# Patient Record
Sex: FEMALE | Race: WHITE | NOT HISPANIC OR LATINO
[De-identification: names, ages, dates, MRNs, and addresses within clinical notes are randomized per-mention and may not be internally consistent; named-entity substitution may affect disease eponyms.]

---

## 2017-04-24 ENCOUNTER — APPOINTMENT (OUTPATIENT)
Dept: OTOLARYNGOLOGY | Facility: CLINIC | Age: 57
End: 2017-04-24

## 2017-04-24 PROBLEM — Z00.00 ENCOUNTER FOR PREVENTIVE HEALTH EXAMINATION: Status: ACTIVE | Noted: 2017-04-24

## 2017-05-23 ENCOUNTER — OUTPATIENT (OUTPATIENT)
Dept: OUTPATIENT SERVICES | Facility: HOSPITAL | Age: 57
LOS: 1 days | Discharge: HOME | End: 2017-05-23

## 2017-06-28 DIAGNOSIS — Z12.31 ENCOUNTER FOR SCREENING MAMMOGRAM FOR MALIGNANT NEOPLASM OF BREAST: ICD-10-CM

## 2017-08-31 ENCOUNTER — OUTPATIENT (OUTPATIENT)
Dept: OUTPATIENT SERVICES | Facility: HOSPITAL | Age: 57
LOS: 1 days | Discharge: HOME | End: 2017-08-31

## 2017-08-31 DIAGNOSIS — C71.9 MALIGNANT NEOPLASM OF BRAIN, UNSPECIFIED: ICD-10-CM

## 2017-08-31 DIAGNOSIS — Z92.3 PERSONAL HISTORY OF IRRADIATION: ICD-10-CM

## 2017-08-31 DIAGNOSIS — J18.9 PNEUMONIA, UNSPECIFIED ORGANISM: ICD-10-CM

## 2017-08-31 DIAGNOSIS — N39.0 URINARY TRACT INFECTION, SITE NOT SPECIFIED: ICD-10-CM

## 2017-08-31 DIAGNOSIS — Z92.29 PERSONAL HISTORY OF OTHER DRUG THERAPY: ICD-10-CM

## 2017-08-31 DIAGNOSIS — R22.0 LOCALIZED SWELLING, MASS AND LUMP, HEAD: ICD-10-CM

## 2017-09-06 ENCOUNTER — INPATIENT (INPATIENT)
Facility: HOSPITAL | Age: 57
LOS: 1 days | Discharge: HOME | End: 2017-09-08
Attending: INTERNAL MEDICINE

## 2017-09-06 DIAGNOSIS — Z92.3 PERSONAL HISTORY OF IRRADIATION: ICD-10-CM

## 2017-09-06 DIAGNOSIS — Z92.29 PERSONAL HISTORY OF OTHER DRUG THERAPY: ICD-10-CM

## 2017-09-06 DIAGNOSIS — J18.9 PNEUMONIA, UNSPECIFIED ORGANISM: ICD-10-CM

## 2017-09-06 DIAGNOSIS — N39.0 URINARY TRACT INFECTION, SITE NOT SPECIFIED: ICD-10-CM

## 2017-09-06 DIAGNOSIS — C71.9 MALIGNANT NEOPLASM OF BRAIN, UNSPECIFIED: ICD-10-CM

## 2017-09-12 DIAGNOSIS — C71.4 MALIGNANT NEOPLASM OF OCCIPITAL LOBE: ICD-10-CM

## 2017-09-12 DIAGNOSIS — R55 SYNCOPE AND COLLAPSE: ICD-10-CM

## 2017-09-12 DIAGNOSIS — G43.909 MIGRAINE, UNSPECIFIED, NOT INTRACTABLE, WITHOUT STATUS MIGRAINOSUS: ICD-10-CM

## 2017-09-12 DIAGNOSIS — D72.829 ELEVATED WHITE BLOOD CELL COUNT, UNSPECIFIED: ICD-10-CM

## 2017-09-12 DIAGNOSIS — R32 UNSPECIFIED URINARY INCONTINENCE: ICD-10-CM

## 2017-09-12 DIAGNOSIS — T38.0X5A ADVERSE EFFECT OF GLUCOCORTICOIDS AND SYNTHETIC ANALOGUES, INITIAL ENCOUNTER: ICD-10-CM

## 2017-09-14 ENCOUNTER — INPATIENT (INPATIENT)
Facility: HOSPITAL | Age: 57
LOS: 7 days | Discharge: NURSING, REHAB OR LONG-TERM CARE | End: 2017-09-22
Attending: NEUROLOGICAL SURGERY

## 2017-09-14 DIAGNOSIS — Z92.3 PERSONAL HISTORY OF IRRADIATION: ICD-10-CM

## 2017-09-14 DIAGNOSIS — J18.9 PNEUMONIA, UNSPECIFIED ORGANISM: ICD-10-CM

## 2017-09-14 DIAGNOSIS — N39.0 URINARY TRACT INFECTION, SITE NOT SPECIFIED: ICD-10-CM

## 2017-09-14 DIAGNOSIS — Z92.29 PERSONAL HISTORY OF OTHER DRUG THERAPY: ICD-10-CM

## 2017-09-14 DIAGNOSIS — C71.9 MALIGNANT NEOPLASM OF BRAIN, UNSPECIFIED: ICD-10-CM
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
## 2017-09-21 DIAGNOSIS — S06.0X9A CONCUSSION WITH LOSS OF CONSCIOUSNESS OF UNSPECIFIED DURATION, INITIAL ENCOUNTER: ICD-10-CM
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
## 2017-09-22 ENCOUNTER — INPATIENT (INPATIENT)
Facility: HOSPITAL | Age: 57
LOS: 13 days | Discharge: HOME HEALTH | End: 2017-10-06
Attending: PHYSICAL MEDICINE & REHABILITATION

## 2017-09-22 DIAGNOSIS — J18.9 PNEUMONIA, UNSPECIFIED ORGANISM: ICD-10-CM

## 2017-09-22 DIAGNOSIS — N39.0 URINARY TRACT INFECTION, SITE NOT SPECIFIED: ICD-10-CM

## 2017-09-22 DIAGNOSIS — Z92.29 PERSONAL HISTORY OF OTHER DRUG THERAPY: ICD-10-CM

## 2017-09-22 DIAGNOSIS — Z92.3 PERSONAL HISTORY OF IRRADIATION: ICD-10-CM

## 2017-09-22 DIAGNOSIS — C71.9 MALIGNANT NEOPLASM OF BRAIN, UNSPECIFIED: ICD-10-CM

## 2017-09-26 DIAGNOSIS — G93.6 CEREBRAL EDEMA: ICD-10-CM

## 2017-09-26 DIAGNOSIS — C71.9 MALIGNANT NEOPLASM OF BRAIN, UNSPECIFIED: ICD-10-CM

## 2017-09-26 DIAGNOSIS — R47.01 APHASIA: ICD-10-CM

## 2017-09-26 DIAGNOSIS — C71.2 MALIGNANT NEOPLASM OF TEMPORAL LOBE: ICD-10-CM

## 2017-09-26 DIAGNOSIS — R32 UNSPECIFIED URINARY INCONTINENCE: ICD-10-CM

## 2017-09-26 DIAGNOSIS — R29.810 FACIAL WEAKNESS: ICD-10-CM

## 2017-09-26 DIAGNOSIS — F43.21 ADJUSTMENT DISORDER WITH DEPRESSED MOOD: ICD-10-CM

## 2017-10-10 DIAGNOSIS — R21 RASH AND OTHER NONSPECIFIC SKIN ERUPTION: ICD-10-CM

## 2017-10-10 DIAGNOSIS — D64.9 ANEMIA, UNSPECIFIED: ICD-10-CM

## 2017-10-10 DIAGNOSIS — Z48.3 AFTERCARE FOLLOWING SURGERY FOR NEOPLASM: ICD-10-CM

## 2017-10-10 DIAGNOSIS — C71.9 MALIGNANT NEOPLASM OF BRAIN, UNSPECIFIED: ICD-10-CM

## 2017-10-10 DIAGNOSIS — F41.9 ANXIETY DISORDER, UNSPECIFIED: ICD-10-CM

## 2017-10-10 DIAGNOSIS — K59.00 CONSTIPATION, UNSPECIFIED: ICD-10-CM

## 2017-10-10 DIAGNOSIS — R47.01 APHASIA: ICD-10-CM

## 2017-10-10 DIAGNOSIS — D72.829 ELEVATED WHITE BLOOD CELL COUNT, UNSPECIFIED: ICD-10-CM

## 2017-10-11 ENCOUNTER — APPOINTMENT (OUTPATIENT)
Dept: HEMATOLOGY ONCOLOGY | Facility: CLINIC | Age: 57
End: 2017-10-11

## 2017-10-11 VITALS
DIASTOLIC BLOOD PRESSURE: 67 MMHG | TEMPERATURE: 97.9 F | SYSTOLIC BLOOD PRESSURE: 132 MMHG | BODY MASS INDEX: 31.85 KG/M2 | HEIGHT: 59 IN | RESPIRATION RATE: 16 BRPM | HEART RATE: 72 BPM | WEIGHT: 158 LBS

## 2017-10-11 DIAGNOSIS — C71.9 MALIGNANT NEOPLASM OF BRAIN, UNSPECIFIED: ICD-10-CM

## 2017-10-12 PROBLEM — C71.9 GLIOBLASTOMA MULTIFORME: Status: ACTIVE | Noted: 2017-10-11

## 2017-10-12 RX ORDER — LEVETIRACETAM 500 MG/1
500 TABLET, FILM COATED ORAL
Qty: 60 | Refills: 0 | Status: ACTIVE | COMMUNITY
Start: 2017-09-01

## 2017-10-12 RX ORDER — PANTOPRAZOLE 40 MG/1
40 TABLET, DELAYED RELEASE ORAL
Qty: 90 | Refills: 0 | Status: ACTIVE | COMMUNITY
Start: 2017-10-06

## 2017-11-08 ENCOUNTER — APPOINTMENT (OUTPATIENT)
Dept: HEMATOLOGY ONCOLOGY | Facility: CLINIC | Age: 57
End: 2017-11-08

## 2017-11-15 ENCOUNTER — APPOINTMENT (OUTPATIENT)
Dept: HEMATOLOGY ONCOLOGY | Facility: CLINIC | Age: 57
End: 2017-11-15

## 2017-11-15 ENCOUNTER — OTHER (OUTPATIENT)
Age: 57
End: 2017-11-15

## 2017-11-15 VITALS
SYSTOLIC BLOOD PRESSURE: 107 MMHG | HEART RATE: 76 BPM | WEIGHT: 158 LBS | RESPIRATION RATE: 16 BRPM | BODY MASS INDEX: 31.85 KG/M2 | TEMPERATURE: 96.4 F | HEIGHT: 59 IN | DIASTOLIC BLOOD PRESSURE: 70 MMHG

## 2017-11-15 RX ORDER — TEMOZOLOMIDE 100 MG/1
100 CAPSULE ORAL
Qty: 5 | Refills: 0 | Status: ACTIVE | COMMUNITY
Start: 2017-11-15 | End: 1900-01-01

## 2017-11-15 RX ORDER — TEMOZOLOMIDE 250 MG/1
250 CAPSULE ORAL
Qty: 5 | Refills: 0 | Status: ACTIVE | COMMUNITY
Start: 2017-10-12 | End: 1900-01-01

## 2017-11-15 RX ORDER — FLUCONAZOLE 150 MG/1
150 TABLET ORAL
Qty: 5 | Refills: 0 | Status: ACTIVE | COMMUNITY
Start: 2017-11-15 | End: 1900-01-01

## 2017-11-16 LAB
ALBUMIN SERPL-MCNC: 3.7 G/DL
ALBUMIN/GLOB SERPL: 1.76
ALP SERPL-CCNC: 41 IU/L
ALT SERPL-CCNC: 54 IU/L
ANION GAP SERPL CALC-SCNC: 10 MEQ/L
AST SERPL-CCNC: 24 IU/L
BASOPHILS # BLD: 0.01 TH/MM3
BASOPHILS NFR BLD: 0.1 %
BILIRUB SERPL-MCNC: 0.6 MG/DL
BUN SERPL-MCNC: 20 MG/DL
BUN/CREAT SERPL: 25.6 %
CALCIUM SERPL-MCNC: 9.2 MG/DL
CHLORIDE SERPL-SCNC: 103 MEQ/L
CO2 SERPL-SCNC: 25 MEQ/L
CREAT SERPL-MCNC: 0.78 MG/DL
EOSINOPHIL # BLD: 0 TH/MM3
EOSINOPHIL NFR BLD: 0 %
ERYTHROCYTE [DISTWIDTH] IN BLOOD BY AUTOMATED COUNT: 16.2 %
GFR SERPL CREATININE-BSD FRML MDRD: 76
GLUCOSE SERPL-MCNC: 123 MG/DL
GRANULOCYTES # BLD: 7.94 TH/MM3
GRANULOCYTES NFR BLD: 87.3 %
HCT VFR BLD AUTO: 40.3 %
HGB BLD-MCNC: 13.5 G/DL
IMM GRANULOCYTES # BLD: 0.1 TH/MM3
IMM GRANULOCYTES NFR BLD: 1.1 %
LYMPHOCYTES # BLD: 0.87 TH/MM3
LYMPHOCYTES NFR BLD: 9.6 %
MCH RBC QN AUTO: 29.5 PG
MCHC RBC AUTO-ENTMCNC: 33.5 G/DL
MCV RBC AUTO: 88 FL
MONOCYTES # BLD: 0.17 TH/MM3
MONOCYTES NFR BLD: 1.9 %
PLATELET # BLD: 142 TH/MM3
PMV BLD AUTO: 8.4 FL
POTASSIUM SERPL-SCNC: 4.1 MMOL/L
PROT SERPL-MCNC: 5.8 G/DL
RBC # BLD AUTO: 4.58 MIL/MM3
SODIUM SERPL-SCNC: 138 MEQ/L
WBC # BLD: 9.09 TH/MM3

## 2017-11-17 ENCOUNTER — INPATIENT (INPATIENT)
Facility: HOSPITAL | Age: 57
LOS: 11 days | Discharge: HOME | End: 2017-11-29
Attending: INTERNAL MEDICINE

## 2017-11-17 DIAGNOSIS — N39.0 URINARY TRACT INFECTION, SITE NOT SPECIFIED: ICD-10-CM

## 2017-11-17 DIAGNOSIS — Z92.29 PERSONAL HISTORY OF OTHER DRUG THERAPY: ICD-10-CM

## 2017-11-17 DIAGNOSIS — Z92.3 PERSONAL HISTORY OF IRRADIATION: ICD-10-CM

## 2017-11-17 DIAGNOSIS — C71.9 MALIGNANT NEOPLASM OF BRAIN, UNSPECIFIED: ICD-10-CM

## 2017-11-17 DIAGNOSIS — J18.9 PNEUMONIA, UNSPECIFIED ORGANISM: ICD-10-CM

## 2017-11-30 DIAGNOSIS — Z66 DO NOT RESUSCITATE: ICD-10-CM

## 2017-11-30 DIAGNOSIS — G93.41 METABOLIC ENCEPHALOPATHY: ICD-10-CM

## 2017-11-30 DIAGNOSIS — C71.9 MALIGNANT NEOPLASM OF BRAIN, UNSPECIFIED: ICD-10-CM

## 2017-11-30 DIAGNOSIS — G51.0 BELL'S PALSY: ICD-10-CM

## 2017-11-30 DIAGNOSIS — R41.82 ALTERED MENTAL STATUS, UNSPECIFIED: ICD-10-CM

## 2017-11-30 DIAGNOSIS — F41.9 ANXIETY DISORDER, UNSPECIFIED: ICD-10-CM

## 2017-11-30 DIAGNOSIS — G93.6 CEREBRAL EDEMA: ICD-10-CM

## 2017-11-30 DIAGNOSIS — R45.1 RESTLESSNESS AND AGITATION: ICD-10-CM

## 2017-11-30 DIAGNOSIS — R47.01 APHASIA: ICD-10-CM

## 2017-11-30 DIAGNOSIS — Z92.21 PERSONAL HISTORY OF ANTINEOPLASTIC CHEMOTHERAPY: ICD-10-CM

## 2017-11-30 DIAGNOSIS — Z92.3 PERSONAL HISTORY OF IRRADIATION: ICD-10-CM

## 2017-12-13 ENCOUNTER — APPOINTMENT (OUTPATIENT)
Dept: HEMATOLOGY ONCOLOGY | Facility: CLINIC | Age: 57
End: 2017-12-13

## 2017-12-13 ENCOUNTER — OUTPATIENT (OUTPATIENT)
Dept: OUTPATIENT SERVICES | Facility: HOSPITAL | Age: 57
LOS: 1 days | Discharge: HOME | End: 2017-12-13

## 2017-12-13 VITALS
HEART RATE: 98 BPM | SYSTOLIC BLOOD PRESSURE: 121 MMHG | TEMPERATURE: 97.1 F | HEIGHT: 59 IN | RESPIRATION RATE: 16 BRPM | BODY MASS INDEX: 30.44 KG/M2 | DIASTOLIC BLOOD PRESSURE: 80 MMHG | WEIGHT: 151 LBS

## 2017-12-13 DIAGNOSIS — C71.9 MALIGNANT NEOPLASM OF BRAIN, UNSPECIFIED: ICD-10-CM

## 2017-12-13 RX ORDER — LEVETIRACETAM 500 MG/1
500 TABLET, FILM COATED ORAL TWICE DAILY
Qty: 30 | Refills: 4 | Status: ACTIVE | COMMUNITY
Start: 2017-11-15 | End: 1900-01-01

## 2017-12-13 RX ORDER — DEXAMETHASONE 4 MG/1
4 TABLET ORAL
Qty: 120 | Refills: 0 | Status: ACTIVE | COMMUNITY
Start: 2017-10-11 | End: 1900-01-01

## 2017-12-14 ENCOUNTER — CHART COPY (OUTPATIENT)
Age: 57
End: 2017-12-14

## 2017-12-14 LAB
ALBUMIN SERPL-MCNC: 3.4 G/DL
ALBUMIN/GLOB SERPL: 2
ALP SERPL-CCNC: 61 IU/L
ALT SERPL-CCNC: 90 IU/L
ANION GAP SERPL CALC-SCNC: 10 MEQ/L
AST SERPL-CCNC: 56 IU/L
BASOPHILS # BLD: 0 TH/MM3
BASOPHILS NFR BLD: 0 %
BILIRUB SERPL-MCNC: 2 MG/DL
BUN SERPL-MCNC: 29 MG/DL
BUN/CREAT SERPL: 35.4 %
CALCIUM SERPL-MCNC: 8.9 MG/DL
CHLORIDE SERPL-SCNC: 107 MEQ/L
CO2 SERPL-SCNC: 20 MEQ/L
CREAT SERPL-MCNC: 0.82 MG/DL
EOSINOPHIL # BLD: 0.02 TH/MM3
EOSINOPHIL NFR BLD: 0.3 %
ERYTHROCYTE [DISTWIDTH] IN BLOOD BY AUTOMATED COUNT: 16 %
GFR SERPL CREATININE-BSD FRML MDRD: 71
GLUCOSE SERPL-MCNC: 142 MG/DL
GRANULOCYTES # BLD: 7.45 TH/MM3
GRANULOCYTES NFR BLD: 93 %
HCT VFR BLD AUTO: 39.3 %
HGB BLD-MCNC: 12.9 G/DL
IMM GRANULOCYTES # BLD: 0.02 TH/MM3
IMM GRANULOCYTES NFR BLD: 0.3 %
LYMPHOCYTES # BLD: 0.47 TH/MM3
LYMPHOCYTES NFR BLD: 5.9 %
MCH RBC QN AUTO: 29.5 PG
MCHC RBC AUTO-ENTMCNC: 32.8 G/DL
MCV RBC AUTO: 89.7 FL
MONOCYTES # BLD: 0.04 TH/MM3
MONOCYTES NFR BLD: 0.5 %
PLATELET # BLD: 125 TH/MM3
PMV BLD AUTO: 9.8 FL
POTASSIUM SERPL-SCNC: 4.5 MMOL/L
PROT SERPL-MCNC: 5.1 G/DL
RBC # BLD AUTO: 4.38 MIL/MM3
SODIUM SERPL-SCNC: 137 MEQ/L
WBC # BLD: 8 TH/MM3

## 2017-12-18 ENCOUNTER — APPOINTMENT (OUTPATIENT)
Dept: HEMATOLOGY ONCOLOGY | Facility: CLINIC | Age: 57
End: 2017-12-18

## 2017-12-18 ENCOUNTER — INPATIENT (INPATIENT)
Facility: HOSPITAL | Age: 57
LOS: 8 days | Discharge: HOSPICE | End: 2017-12-27
Attending: INTERNAL MEDICINE

## 2017-12-18 DIAGNOSIS — N39.0 URINARY TRACT INFECTION, SITE NOT SPECIFIED: ICD-10-CM

## 2017-12-18 DIAGNOSIS — Z92.3 PERSONAL HISTORY OF IRRADIATION: ICD-10-CM

## 2017-12-18 DIAGNOSIS — J18.9 PNEUMONIA, UNSPECIFIED ORGANISM: ICD-10-CM

## 2017-12-18 DIAGNOSIS — C71.9 MALIGNANT NEOPLASM OF BRAIN, UNSPECIFIED: ICD-10-CM

## 2017-12-18 DIAGNOSIS — Z92.29 PERSONAL HISTORY OF OTHER DRUG THERAPY: ICD-10-CM

## 2017-12-20 ENCOUNTER — APPOINTMENT (OUTPATIENT)
Dept: HEMATOLOGY ONCOLOGY | Facility: CLINIC | Age: 57
End: 2017-12-20

## 2017-12-27 ENCOUNTER — OUTPATIENT (OUTPATIENT)
Dept: OUTPATIENT SERVICES | Facility: HOSPITAL | Age: 57
LOS: 1 days | Discharge: HOSPICE | End: 2017-12-27

## 2017-12-27 DIAGNOSIS — C71.9 MALIGNANT NEOPLASM OF BRAIN, UNSPECIFIED: ICD-10-CM

## 2018-01-10 DIAGNOSIS — A41.9 SEPSIS, UNSPECIFIED ORGANISM: ICD-10-CM

## 2018-01-10 DIAGNOSIS — R19.7 DIARRHEA, UNSPECIFIED: ICD-10-CM

## 2018-01-10 DIAGNOSIS — Z92.3 PERSONAL HISTORY OF IRRADIATION: ICD-10-CM

## 2018-01-10 DIAGNOSIS — E87.8 OTHER DISORDERS OF ELECTROLYTE AND FLUID BALANCE, NOT ELSEWHERE CLASSIFIED: ICD-10-CM

## 2018-01-10 DIAGNOSIS — Z66 DO NOT RESUSCITATE: ICD-10-CM

## 2018-01-10 DIAGNOSIS — R50.81 FEVER PRESENTING WITH CONDITIONS CLASSIFIED ELSEWHERE: ICD-10-CM

## 2018-01-10 DIAGNOSIS — D69.59 OTHER SECONDARY THROMBOCYTOPENIA: ICD-10-CM

## 2018-01-10 DIAGNOSIS — R26.9 UNSPECIFIED ABNORMALITIES OF GAIT AND MOBILITY: ICD-10-CM

## 2018-01-10 DIAGNOSIS — E87.2 ACIDOSIS: ICD-10-CM

## 2018-01-10 DIAGNOSIS — C71.9 MALIGNANT NEOPLASM OF BRAIN, UNSPECIFIED: ICD-10-CM

## 2018-01-10 DIAGNOSIS — D61.810 ANTINEOPLASTIC CHEMOTHERAPY INDUCED PANCYTOPENIA: ICD-10-CM

## 2018-01-10 DIAGNOSIS — T45.1X5A ADVERSE EFFECT OF ANTINEOPLASTIC AND IMMUNOSUPPRESSIVE DRUGS, INITIAL ENCOUNTER: ICD-10-CM

## 2018-01-10 DIAGNOSIS — J96.00 ACUTE RESPIRATORY FAILURE, UNSPECIFIED WHETHER WITH HYPOXIA OR HYPERCAPNIA: ICD-10-CM

## 2018-01-10 DIAGNOSIS — Z51.5 ENCOUNTER FOR PALLIATIVE CARE: ICD-10-CM

## 2018-01-10 DIAGNOSIS — J18.9 PNEUMONIA, UNSPECIFIED ORGANISM: ICD-10-CM

## 2018-01-10 DIAGNOSIS — G93.6 CEREBRAL EDEMA: ICD-10-CM

## 2018-01-10 DIAGNOSIS — Z92.21 PERSONAL HISTORY OF ANTINEOPLASTIC CHEMOTHERAPY: ICD-10-CM

## 2018-01-10 DIAGNOSIS — N39.0 URINARY TRACT INFECTION, SITE NOT SPECIFIED: ICD-10-CM

## 2018-01-15 ENCOUNTER — INPATIENT (INPATIENT)
Facility: HOSPITAL | Age: 58
LOS: 3 days | Discharge: HOSPICE | End: 2018-01-19
Attending: INTERNAL MEDICINE

## 2018-01-15 DIAGNOSIS — Z92.29 PERSONAL HISTORY OF OTHER DRUG THERAPY: ICD-10-CM

## 2018-01-15 DIAGNOSIS — Z92.3 PERSONAL HISTORY OF IRRADIATION: ICD-10-CM

## 2018-01-15 DIAGNOSIS — J18.9 PNEUMONIA, UNSPECIFIED ORGANISM: ICD-10-CM

## 2018-01-15 DIAGNOSIS — N39.0 URINARY TRACT INFECTION, SITE NOT SPECIFIED: ICD-10-CM

## 2018-01-15 DIAGNOSIS — C71.9 MALIGNANT NEOPLASM OF BRAIN, UNSPECIFIED: ICD-10-CM

## 2018-01-19 ENCOUNTER — OUTPATIENT (OUTPATIENT)
Dept: OUTPATIENT SERVICES | Facility: HOSPITAL | Age: 58
LOS: 1 days | Discharge: HOME | End: 2018-01-19

## 2018-01-19 DIAGNOSIS — C71.9 MALIGNANT NEOPLASM OF BRAIN, UNSPECIFIED: ICD-10-CM

## 2018-01-24 DIAGNOSIS — Z92.21 PERSONAL HISTORY OF ANTINEOPLASTIC CHEMOTHERAPY: ICD-10-CM

## 2018-01-24 DIAGNOSIS — Z51.5 ENCOUNTER FOR PALLIATIVE CARE: ICD-10-CM

## 2018-01-24 DIAGNOSIS — N39.0 URINARY TRACT INFECTION, SITE NOT SPECIFIED: ICD-10-CM

## 2018-01-24 DIAGNOSIS — Z92.3 PERSONAL HISTORY OF IRRADIATION: ICD-10-CM

## 2018-01-24 DIAGNOSIS — Z88.8 ALLERGY STATUS TO OTHER DRUGS, MEDICAMENTS AND BIOLOGICAL SUBSTANCES STATUS: ICD-10-CM

## 2018-01-24 DIAGNOSIS — C71.9 MALIGNANT NEOPLASM OF BRAIN, UNSPECIFIED: ICD-10-CM

## 2018-01-24 DIAGNOSIS — Z66 DO NOT RESUSCITATE: ICD-10-CM

## 2018-01-24 DIAGNOSIS — J18.9 PNEUMONIA, UNSPECIFIED ORGANISM: ICD-10-CM

## 2018-02-01 ENCOUNTER — INPATIENT (INPATIENT)
Facility: HOSPITAL | Age: 58
LOS: 6 days | Discharge: HOME | End: 2018-02-08
Attending: INTERNAL MEDICINE

## 2018-02-03 VITALS — WEIGHT: 149.25 LBS | HEIGHT: 65 IN

## 2018-02-03 RX ORDER — LEVETIRACETAM 250 MG/1
750 TABLET, FILM COATED ORAL
Qty: 0 | Refills: 0 | Status: DISCONTINUED | OUTPATIENT
Start: 2018-02-03 | End: 2018-02-03

## 2018-02-03 RX ORDER — SODIUM CHLORIDE 9 MG/ML
1000 INJECTION INTRAMUSCULAR; INTRAVENOUS; SUBCUTANEOUS
Qty: 0 | Refills: 0 | Status: DISCONTINUED | OUTPATIENT
Start: 2018-02-03 | End: 2018-02-04

## 2018-02-03 RX ORDER — LEVETIRACETAM 250 MG/1
750 TABLET, FILM COATED ORAL EVERY 12 HOURS
Qty: 0 | Refills: 0 | Status: DISCONTINUED | OUTPATIENT
Start: 2018-02-03 | End: 2018-02-06

## 2018-02-03 RX ORDER — IPRATROPIUM/ALBUTEROL SULFATE 18-103MCG
3 AEROSOL WITH ADAPTER (GRAM) INHALATION EVERY 4 HOURS
Qty: 0 | Refills: 0 | Status: COMPLETED | OUTPATIENT
Start: 2018-02-03 | End: 2018-02-04

## 2018-02-03 RX ORDER — ENOXAPARIN SODIUM 100 MG/ML
40 INJECTION SUBCUTANEOUS AT BEDTIME
Qty: 0 | Refills: 0 | Status: DISCONTINUED | OUTPATIENT
Start: 2018-02-03 | End: 2018-02-08

## 2018-02-03 RX ORDER — IPRATROPIUM/ALBUTEROL SULFATE 18-103MCG
3 AEROSOL WITH ADAPTER (GRAM) INHALATION EVERY 4 HOURS
Qty: 0 | Refills: 0 | Status: DISCONTINUED | OUTPATIENT
Start: 2018-02-03 | End: 2018-02-08

## 2018-02-03 RX ORDER — MORPHINE SULFATE 50 MG/1
10 CAPSULE, EXTENDED RELEASE ORAL EVERY 6 HOURS
Qty: 0 | Refills: 0 | Status: DISCONTINUED | OUTPATIENT
Start: 2018-02-03 | End: 2018-02-05

## 2018-02-03 RX ORDER — DEXAMETHASONE 0.5 MG/5ML
2 ELIXIR ORAL EVERY 8 HOURS
Qty: 0 | Refills: 0 | Status: DISCONTINUED | OUTPATIENT
Start: 2018-02-03 | End: 2018-02-07

## 2018-02-03 RX ORDER — PANTOPRAZOLE SODIUM 20 MG/1
40 TABLET, DELAYED RELEASE ORAL DAILY
Qty: 0 | Refills: 0 | Status: DISCONTINUED | OUTPATIENT
Start: 2018-02-03 | End: 2018-02-07

## 2018-02-03 RX ADMIN — ENOXAPARIN SODIUM 40 MILLIGRAM(S): 100 INJECTION SUBCUTANEOUS at 21:48

## 2018-02-03 RX ADMIN — LEVETIRACETAM 400 MILLIGRAM(S): 250 TABLET, FILM COATED ORAL at 22:16

## 2018-02-03 RX ADMIN — Medication 2 MILLIGRAM(S): at 21:48

## 2018-02-03 RX ADMIN — Medication 2 MILLIGRAM(S): at 21:56

## 2018-02-03 RX ADMIN — SODIUM CHLORIDE 50 MILLILITER(S): 9 INJECTION INTRAMUSCULAR; INTRAVENOUS; SUBCUTANEOUS at 21:47

## 2018-02-04 RX ADMIN — Medication 2 MILLIGRAM(S): at 23:30

## 2018-02-04 RX ADMIN — MORPHINE SULFATE 10 MILLIGRAM(S): 50 CAPSULE, EXTENDED RELEASE ORAL at 21:35

## 2018-02-04 RX ADMIN — LEVETIRACETAM 400 MILLIGRAM(S): 250 TABLET, FILM COATED ORAL at 05:58

## 2018-02-04 RX ADMIN — Medication 2 MILLIGRAM(S): at 14:39

## 2018-02-04 RX ADMIN — MORPHINE SULFATE 10 MILLIGRAM(S): 50 CAPSULE, EXTENDED RELEASE ORAL at 11:28

## 2018-02-04 RX ADMIN — ENOXAPARIN SODIUM 40 MILLIGRAM(S): 100 INJECTION SUBCUTANEOUS at 21:28

## 2018-02-04 RX ADMIN — PANTOPRAZOLE SODIUM 40 MILLIGRAM(S): 20 TABLET, DELAYED RELEASE ORAL at 11:01

## 2018-02-04 RX ADMIN — LEVETIRACETAM 400 MILLIGRAM(S): 250 TABLET, FILM COATED ORAL at 18:30

## 2018-02-04 RX ADMIN — Medication 2 MILLIGRAM(S): at 21:27

## 2018-02-04 RX ADMIN — Medication 2 MILLIGRAM(S): at 05:46

## 2018-02-04 RX ADMIN — MORPHINE SULFATE 10 MILLIGRAM(S): 50 CAPSULE, EXTENDED RELEASE ORAL at 22:08

## 2018-02-04 RX ADMIN — MORPHINE SULFATE 10 MILLIGRAM(S): 50 CAPSULE, EXTENDED RELEASE ORAL at 10:50

## 2018-02-04 RX ADMIN — MORPHINE SULFATE 10 MILLIGRAM(S): 50 CAPSULE, EXTENDED RELEASE ORAL at 03:39

## 2018-02-04 NOTE — PROGRESS NOTE ADULT - SUBJECTIVE AND OBJECTIVE BOX
Patient is a 57y old  Female who presents with a chief complaint of unresponsiveness    INTERVAL HPI/OVERNIGHT EVENTS: lethargy  HPI:    MEDICATIONS  (STANDING):  dexamethasone  Injectable 2 milliGRAM(s) IV Push every 8 hours  enoxaparin Injectable 40 milliGRAM(s) SubCutaneous at bedtime  levETIRAcetam  IVPB 750 milliGRAM(s) IV Intermittent every 12 hours  pantoprazole  Injectable 40 milliGRAM(s) IV Push daily    MEDICATIONS  (PRN):  ALBUTerol/ipratropium for Nebulization 3 milliLiter(s) Nebulizer every 4 hours PRN Shortness of Breath  LORazepam   Injectable 2 milliGRAM(s) IV Push every 4 hours PRN seizures  morphine   Solution 10 milliGRAM(s) Oral every 6 hours PRN Moderate Pain (4 - 6)    Home Medications:      Vital Signs Last 24 Hrs  T(C): 35.6 (04 Feb 2018 07:50), Max: 35.9 (03 Feb 2018 15:15)  T(F): 96.1 (04 Feb 2018 07:50), Max: 96.7 (03 Feb 2018 15:15)  HR: 80 (04 Feb 2018 07:50) (69 - 84)  BP: 156/76 (04 Feb 2018 07:50) (140/77 - 156/76)  BP(mean): --  RR: 18 (03 Feb 2018 23:57) (18 - 20)  SpO2: --  CAPILLARY BLOOD GLUCOSE          General: lethargic, chronically ill appearing  CV: S1 S2  Pulm: decreased breath sounds at base  Abd: NT/ND/S +BS  EXT: no clubbing/cyanosis/edema  Neuro: unable to assess    LABS:      RADIOLOGY & ADDITIONAL TESTS:    Assessment and Plan:                    GI/DVT Prophylaxis  discussed with staff Patient is a 57y old  Female who presents with a chief complaint of unresponsiveness    INTERVAL HPI/OVERNIGHT EVENTS: lethargy  HPI:    MEDICATIONS  (STANDING):  dexamethasone  Injectable 2 milliGRAM(s) IV Push every 8 hours  enoxaparin Injectable 40 milliGRAM(s) SubCutaneous at bedtime  levETIRAcetam  IVPB 750 milliGRAM(s) IV Intermittent every 12 hours  pantoprazole  Injectable 40 milliGRAM(s) IV Push daily    MEDICATIONS  (PRN):  ALBUTerol/ipratropium for Nebulization 3 milliLiter(s) Nebulizer every 4 hours PRN Shortness of Breath  LORazepam   Injectable 2 milliGRAM(s) IV Push every 4 hours PRN seizures  morphine   Solution 10 milliGRAM(s) Oral every 6 hours PRN Moderate Pain (4 - 6)    Home Medications:      Vital Signs Last 24 Hrs  T(C): 35.6 (04 Feb 2018 07:50), Max: 35.9 (03 Feb 2018 15:15)  T(F): 96.1 (04 Feb 2018 07:50), Max: 96.7 (03 Feb 2018 15:15)  HR: 80 (04 Feb 2018 07:50) (69 - 84)  BP: 156/76 (04 Feb 2018 07:50) (140/77 - 156/76)  BP(mean): --  RR: 18 (03 Feb 2018 23:57) (18 - 20)  SpO2: --  CAPILLARY BLOOD GLUCOSE          General: lethargic, chronically ill appearing  CV: S1 S2  Pulm: decreased breath sounds at base  Abd: NT/ND/S +BS  EXT: no clubbing/cyanosis/edema  Neuro: unable to assess    LABS:      RADIOLOGY & ADDITIONAL TESTS:    Assessment and Plan:    56yo with GBM on home hospice p/w unresponsiveness and seizure.    -f/u REEG  -cont Keppra, Decadron IV as pt too lethargic to take anything PO  -if EEG negative and pt does not pass speech and swallow, will d/w daughter Peg placement for med administration as part of hospice care (daughter wants to take pt home on hospice as per my conversation with her once she is able to take meds)  -DNR/DNI  -grave Px                GI/DVT Prophylaxis

## 2018-02-05 LAB
ANION GAP SERPL CALC-SCNC: 9 MMOL/L — SIGNIFICANT CHANGE UP (ref 7–14)
BUN SERPL-MCNC: 24 MG/DL — HIGH (ref 10–20)
CALCIUM SERPL-MCNC: 9 MG/DL — SIGNIFICANT CHANGE UP (ref 8.5–10.1)
CHLORIDE SERPL-SCNC: 109 MMOL/L — SIGNIFICANT CHANGE UP (ref 98–110)
CO2 SERPL-SCNC: 24 MMOL/L — SIGNIFICANT CHANGE UP (ref 17–32)
CREAT SERPL-MCNC: 0.7 MG/DL — SIGNIFICANT CHANGE UP (ref 0.7–1.5)
GLUCOSE SERPL-MCNC: 97 MG/DL — SIGNIFICANT CHANGE UP (ref 70–110)
HCT VFR BLD CALC: 31.7 % — LOW (ref 37–47)
HGB BLD-MCNC: 10.4 G/DL — LOW (ref 14–18)
MCHC RBC-ENTMCNC: 30.6 PG — SIGNIFICANT CHANGE UP (ref 27–31)
MCHC RBC-ENTMCNC: 32.8 G/DL — SIGNIFICANT CHANGE UP (ref 32–37)
MCV RBC AUTO: 93.2 FL — HIGH (ref 81–91)
NRBC # BLD: 1 /100 WBCS — HIGH (ref 0–0)
PLATELET # BLD AUTO: 228 K/UL — SIGNIFICANT CHANGE UP (ref 130–400)
POTASSIUM SERPL-MCNC: 3.3 MMOL/L — LOW (ref 3.5–5)
POTASSIUM SERPL-SCNC: 3.3 MMOL/L — LOW (ref 3.5–5)
RBC # BLD: 3.4 M/UL — LOW (ref 4.2–5.4)
RBC # FLD: 16.5 % — HIGH (ref 11.5–14.5)
SODIUM SERPL-SCNC: 142 MMOL/L — SIGNIFICANT CHANGE UP (ref 135–146)
WBC # BLD: 10.31 K/UL — SIGNIFICANT CHANGE UP (ref 4.8–10.8)
WBC # FLD AUTO: 10.31 K/UL — SIGNIFICANT CHANGE UP (ref 4.8–10.8)

## 2018-02-05 RX ORDER — POTASSIUM CHLORIDE 20 MEQ
10 PACKET (EA) ORAL
Qty: 0 | Refills: 0 | Status: DISCONTINUED | OUTPATIENT
Start: 2018-02-05 | End: 2018-02-05

## 2018-02-05 RX ORDER — MORPHINE SULFATE 50 MG/1
10 CAPSULE, EXTENDED RELEASE ORAL EVERY 6 HOURS
Qty: 0 | Refills: 0 | Status: DISCONTINUED | OUTPATIENT
Start: 2018-02-05 | End: 2018-02-08

## 2018-02-05 RX ORDER — DEXTROSE MONOHYDRATE, SODIUM CHLORIDE, AND POTASSIUM CHLORIDE 50; .745; 4.5 G/1000ML; G/1000ML; G/1000ML
1000 INJECTION, SOLUTION INTRAVENOUS
Qty: 0 | Refills: 0 | Status: DISCONTINUED | OUTPATIENT
Start: 2018-02-05 | End: 2018-02-05

## 2018-02-05 RX ORDER — SODIUM CHLORIDE 9 MG/ML
1000 INJECTION, SOLUTION INTRAVENOUS
Qty: 0 | Refills: 0 | Status: DISCONTINUED | OUTPATIENT
Start: 2018-02-05 | End: 2018-02-05

## 2018-02-05 RX ADMIN — Medication 2 MILLIGRAM(S): at 05:36

## 2018-02-05 RX ADMIN — LEVETIRACETAM 400 MILLIGRAM(S): 250 TABLET, FILM COATED ORAL at 05:36

## 2018-02-05 RX ADMIN — MORPHINE SULFATE 10 MILLIGRAM(S): 50 CAPSULE, EXTENDED RELEASE ORAL at 23:06

## 2018-02-05 RX ADMIN — SODIUM CHLORIDE 75 MILLILITER(S): 9 INJECTION, SOLUTION INTRAVENOUS at 10:49

## 2018-02-05 RX ADMIN — Medication 2 MILLIGRAM(S): at 14:49

## 2018-02-05 RX ADMIN — MORPHINE SULFATE 10 MILLIGRAM(S): 50 CAPSULE, EXTENDED RELEASE ORAL at 05:49

## 2018-02-05 RX ADMIN — ENOXAPARIN SODIUM 40 MILLIGRAM(S): 100 INJECTION SUBCUTANEOUS at 21:40

## 2018-02-05 RX ADMIN — Medication 2 MILLIGRAM(S): at 13:16

## 2018-02-05 RX ADMIN — Medication 2 MILLIGRAM(S): at 21:37

## 2018-02-05 RX ADMIN — MORPHINE SULFATE 10 MILLIGRAM(S): 50 CAPSULE, EXTENDED RELEASE ORAL at 22:36

## 2018-02-05 RX ADMIN — PANTOPRAZOLE SODIUM 40 MILLIGRAM(S): 20 TABLET, DELAYED RELEASE ORAL at 11:07

## 2018-02-05 RX ADMIN — LEVETIRACETAM 400 MILLIGRAM(S): 250 TABLET, FILM COATED ORAL at 17:04

## 2018-02-05 NOTE — PROGRESS NOTE ADULT - SUBJECTIVE AND OBJECTIVE BOX
Patient is a 57y old  Female who presents with a chief complaint of unrseponsiveness.  Was given extra dose of morphine by family.  EMS gave narcan 1mg and alertness improved, but during hospital stay patient has been lethargic at times and agitation at others.  Unsafe swallow, unable to take oral keppra for seizures (had seizure in ER on admission).      PAST MEDICAL & SURGICAL HISTORY:  Glioblastoma Multiforme s/p radiation therapy, last chemo dose: 11/2017, craniotomy and subtotal resection (Sept 2017).        MEDICATIONS  (STANDING):  dexamethasone  Injectable 2 milliGRAM(s) IV Push every 8 hours  enoxaparin Injectable 40 milliGRAM(s) SubCutaneous at bedtime  levETIRAcetam  IVPB 750 milliGRAM(s) IV Intermittent every 12 hours  pantoprazole  Injectable 40 milliGRAM(s) IV Push daily    MEDICATIONS  (PRN):  ALBUTerol/ipratropium for Nebulization 3 milliLiter(s) Nebulizer every 4 hours PRN Shortness of Breath  LORazepam   Injectable 2 milliGRAM(s) IV Push every 4 hours PRN seizures  morphine Concentrate 10 milliGRAM(s) Oral every 6 hours PRN Moderate Pain (4 - 6)      Vital Signs Last 24 Hrs  T(C): 35.9 (05 Feb 2018 15:58), Max: 36.3 (05 Feb 2018 01:51)  T(F): 96.7 (05 Feb 2018 15:58), Max: 97.3 (05 Feb 2018 01:51)  HR: 68 (05 Feb 2018 15:58) (68 - 78)  BP: 124/70 (05 Feb 2018 15:58) (124/70 - 154/81)  BP(mean): --  RR: 18 (05 Feb 2018 15:58) (18 - 18)  SpO2: 97% (05 Feb 2018 01:51) (97% - 97%)  CAPILLARY BLOOD GLUCOSE        I&O's Summary    04 Feb 2018 07:01  -  05 Feb 2018 07:00  --------------------------------------------------------  IN: 100 mL / OUT: 0 mL / NET: 100 mL    05 Feb 2018 07:01  -  05 Feb 2018 16:39  --------------------------------------------------------  IN: 0 mL / OUT: 1 mL / NET: -1 mL        Physical Exam:        -      Cardiac: S1 + S2    -      Pulm: decreased breath sounds at bases     -      GI: soft, no grimacing on palpation          Labs:                        10.4   10.31 )-----------( 228      ( 05 Feb 2018 10:23 )             31.7             02-05    142  |  109  |  24<H>  ----------------------------<  97  3.3<L>   |  24  |  0.7    Ca    9.0      05 Feb 2018 10:23        EEG: < from: EEG (02.03.18 @ 11:00) >  Abnormal due to the presence of   1-generalized slowing as described above  2-focal slowing as described above  3-breech artifact  4-left hemispheric ictal activity    CLINICAL CORRELATION  Consistent with prior left hemispheric surgery and left hemispheric   potential for partial seizure

## 2018-02-05 NOTE — CONSULT NOTE ADULT - SUBJECTIVE AND OBJECTIVE BOX
Neurology Consult    Patient is a 57y old F with glioblastoma multiforme s/p subtotal resection and chemotherapy. Per chart, she was brought in for unresponsiveness after being given extra dose of morphine by her family. EMS gave narcan 1mg with some improvement in mental status. Pt is on hospice. She began seizing approximately 20 minutes after arriving to the ED.  She is on keppra 750mg BID and ativan 2mg PRN for seizures. Neuro was consulted for abnormal EEG results.       HPI:    On approach, pt was sedated. Per pt's 1:1 and daughter at bedside, pt was agitated, pulling out her IV and was given a PRN.     PAST MEDICAL & SURGICAL HISTORY: glioblastoma multiforme s/p subtotal resection and chemotherapy, craniotomy and resection in Sep 2017, right knee surgery      FAMILY HISTORY:      Social History: (-) x 3    Allergies    inhalation anesthetics (Unknown)    Intolerances        MEDICATIONS  (STANDING):  dexamethasone  Injectable 2 milliGRAM(s) IV Push every 8 hours  dextrose 5% + sodium chloride 0.45% with potassium chloride 20 mEq/L 1000 milliLiter(s) (85 mL/Hr) IV Continuous <Continuous>  enoxaparin Injectable 40 milliGRAM(s) SubCutaneous at bedtime  levETIRAcetam  IVPB 750 milliGRAM(s) IV Intermittent every 12 hours  pantoprazole  Injectable 40 milliGRAM(s) IV Push daily  potassium chloride  10 mEq/100 mL IVPB 10 milliEquivalent(s) IV Intermittent every 1 hour    MEDICATIONS  (PRN):  ALBUTerol/ipratropium for Nebulization 3 milliLiter(s) Nebulizer every 4 hours PRN Shortness of Breath  LORazepam   Injectable 2 milliGRAM(s) IV Push every 4 hours PRN seizures  morphine Concentrate 10 milliGRAM(s) Oral every 6 hours PRN Moderate Pain (4 - 6)      Review of systems:    Constitutional: No fever, weight loss or fatigue    Eyes: No eye pain or discharge  ENMT:  No difficulty hearing; No sinus or throat pain  Neck: No pain or stiffness  Respiratory: No cough, wheezing, chills or hemoptysis  Cardiovascular: No chest pain, palpitations, shortness of breath, dyspnea on exertion  Gastrointestinal: No abdominal pain, nausea, vomiting or hematemesis; No diarrhea or constipation.   Genitourinary: No dysuria, frequency, hematuria or incontinence  Neurological: As per HPI  Skin: No rashes or lesions   Endocrine: No heat or cold intolerance; No hair loss  Musculoskeletal: No joint pain or swelling  Psychiatric: No depression, anxiety, mood swings  Heme/Lymph: No easy bruising or bleeding gums    Vital Signs Last 24 Hrs  T(C): 36.2 (2018 08:20), Max: 36.3 (2018 01:51)  T(F): 97.2 (2018 08:20), Max: 97.3 (2018 01:51)  HR: 78 (2018 08:20) (69 - 78)  BP: 154/81 (2018 08:20) (137/73 - 154/81)  BP(mean): --  RR: 18 (2018 11:15) (18 - 18)  SpO2: 97% (2018 01:51) (97% - 97%)    Neurologic Examination:  General:  Appearance is consistent with chronologic age.  No abnormal facies.   General: The patient is oriented to person, place, time and date.  Recent and remote memory intact.  Fund of knowledge is intact and normal.  Language with normal repetition, comprehension and naming.  Nondysarthric.    Cranial nerves: intact VA, VFF.  EOMI w/o nystagmus, skew or reported double vision.  PERRL.  No ptosis/weakness of eyelid closure.  Facial sensation is normal with normal bite.  No facial asymmetry.  Hearing grossly intact b/l.  Palate elevates midline.  Tongue midline.  Motor examination:   Normal tone, bulk and range of motion.  No tenderness, twitching, tremors or involuntary movements.  Formal Muscle Strength Testing: (MRC grade R/L) 5/5 UE; 5/5 LE.  No observable drift.  Reflexes:   2+ b/l pectoralis, biceps, triceps, brachioradialis, patella and Achilles.  Plantar response downgoing b/l.  Jaw jerk, Ercik, clonus absent.  Sensory examination:   Intact to light touch and pinprick, pain, temperature and proprioception and vibration in all extremities.  Cerebellum:   FTN/HKS intact with normal RAQUEL in all limbs.  No dysmetria or dysdiadokinesia.  Gait narrow based and normal.    Labs:   CBC Full  -  ( 2018 10:23 )  WBC Count : 10.31 K/uL  Hemoglobin : 10.4 g/dL  Hematocrit : 31.7 %  Platelet Count - Automated : 228 K/uL  Mean Cell Volume : 93.2 fL  Mean Cell Hemoglobin : 30.6 pg  Mean Cell Hemoglobin Concentration : 32.8 g/dL  Auto Neutrophil # : x  Auto Lymphocyte # : x  Auto Monocyte # : x  Auto Eosinophil # : x  Auto Basophil # : x  Auto Neutrophil % : x  Auto Lymphocyte % : x  Auto Monocyte % : x  Auto Eosinophil % : x  Auto Basophil % : x        142  |  109  |  24<H>  ----------------------------<  97  3.3<L>   |  24  |  0.7    Ca    9.0      2018 10:23        EE/3/18: epileptiform activity, small number, left frontal temporal    Assessment:  Patient is a 57y old F with glioblastoma multiforme s/p subtotal resection and chemotherapy. Per chart, she was brought in for unresponsiveness after being given extra dose of morphine by her family. Pt is on hospice. She began seizing approximately 20 minutes after arriving to the ED.  She is on keppra 750mg BID and ativan 2mg PRN for seizures. Neuro was consulted for abnormal EEG results.       Plan: See below:     18 @ 15:44 Neurology Consult    Patient is a 57y old F with glioblastoma multiforme s/p subtotal resection and chemotherapy. Per chart, she was brought in for unresponsiveness after being given extra dose of morphine by her family. EMS gave narcan 1mg with some improvement in mental status. Pt is on hospice. She began seizing approximately 20 minutes after arriving to the ED.  She is on keppra 750mg BID and ativan 2mg PRN for seizures. Neuro was consulted for abnormal EEG results.       HPI:    On approach, pt was sedated. Per pt's 1:1 and daughter at bedside, pt was agitated, pulling out her IV and was given PRN ativan. Family, PCA, and Nursing was not comfortable with waking the patient up at this time for physical and neurological exam.    PAST MEDICAL & SURGICAL HISTORY: glioblastoma multiforme s/p subtotal resection and chemotherapy, craniotomy and resection in Sep 2017, right knee surgery      FAMILY HISTORY:      Social History: (-) x 3    Allergies    inhalation anesthetics (Unknown)    Intolerances        MEDICATIONS  (STANDING):  dexamethasone  Injectable 2 milliGRAM(s) IV Push every 8 hours  dextrose 5% + sodium chloride 0.45% with potassium chloride 20 mEq/L 1000 milliLiter(s) (85 mL/Hr) IV Continuous <Continuous>  enoxaparin Injectable 40 milliGRAM(s) SubCutaneous at bedtime  levETIRAcetam  IVPB 750 milliGRAM(s) IV Intermittent every 12 hours  pantoprazole  Injectable 40 milliGRAM(s) IV Push daily  potassium chloride  10 mEq/100 mL IVPB 10 milliEquivalent(s) IV Intermittent every 1 hour    MEDICATIONS  (PRN):  ALBUTerol/ipratropium for Nebulization 3 milliLiter(s) Nebulizer every 4 hours PRN Shortness of Breath  LORazepam   Injectable 2 milliGRAM(s) IV Push every 4 hours PRN seizures  morphine Concentrate 10 milliGRAM(s) Oral every 6 hours PRN Moderate Pain (4 - 6)      Review of systems:    Constitutional: No fever, weight loss or fatigue    Eyes: No eye pain or discharge  ENMT:  No difficulty hearing; No sinus or throat pain  Neck: No pain or stiffness  Respiratory: No cough, wheezing, chills or hemoptysis  Cardiovascular: No chest pain, palpitations, shortness of breath, dyspnea on exertion  Gastrointestinal: No abdominal pain, nausea, vomiting or hematemesis; No diarrhea or constipation.   Genitourinary: No dysuria, frequency, hematuria or incontinence  Neurological: As per HPI  Skin: No rashes or lesions   Endocrine: No heat or cold intolerance; No hair loss  Musculoskeletal: No joint pain or swelling  Psychiatric: No depression, anxiety, mood swings  Heme/Lymph: No easy bruising or bleeding gums    Vital Signs Last 24 Hrs  T(C): 36.2 (2018 08:20), Max: 36.3 (2018 01:51)  T(F): 97.2 (2018 08:20), Max: 97.3 (2018 01:51)  HR: 78 (2018 08:20) (69 - 78)  BP: 154/81 (2018 08:20) (137/73 - 154/81)  BP(mean): --  RR: 18 (2018 11:15) (18 - 18)  SpO2: 97% (2018 01:51) (97% - 97%)    Neurologic Examination:  General:  Appearance is consistent with chronologic age.  No abnormal facies.   General: The patient is oriented to person, place, time and date.  Recent and remote memory intact.  Fund of knowledge is intact and normal.  Language with normal repetition, comprehension and naming.  Nondysarthric.    Cranial nerves: intact VA, VFF.  EOMI w/o nystagmus, skew or reported double vision.  PERRL.  No ptosis/weakness of eyelid closure.  Facial sensation is normal with normal bite.  No facial asymmetry.  Hearing grossly intact b/l.  Palate elevates midline.  Tongue midline.  Motor examination:   Normal tone, bulk and range of motion.  No tenderness, twitching, tremors or involuntary movements.  Formal Muscle Strength Testing: (MRC grade R/L) 5/5 UE; 5/5 LE.  No observable drift.  Reflexes:   2+ b/l pectoralis, biceps, triceps, brachioradialis, patella and Achilles.  Plantar response downgoing b/l.  Jaw jerk, Erick, clonus absent.  Sensory examination:   Intact to light touch and pinprick, pain, temperature and proprioception and vibration in all extremities.  Cerebellum:   FTN/HKS intact with normal RAQUEL in all limbs.  No dysmetria or dysdiadokinesia.  Gait narrow based and normal.    Labs:   CBC Full  -  ( 2018 10:23 )  WBC Count : 10.31 K/uL  Hemoglobin : 10.4 g/dL  Hematocrit : 31.7 %  Platelet Count - Automated : 228 K/uL  Mean Cell Volume : 93.2 fL  Mean Cell Hemoglobin : 30.6 pg  Mean Cell Hemoglobin Concentration : 32.8 g/dL  Auto Neutrophil # : x  Auto Lymphocyte # : x  Auto Monocyte # : x  Auto Eosinophil # : x  Auto Basophil # : x  Auto Neutrophil % : x  Auto Lymphocyte % : x  Auto Monocyte % : x  Auto Eosinophil % : x  Auto Basophil % : x        142  |  109  |  24<H>  ----------------------------<  97  3.3<L>   |  24  |  0.7    Ca    9.0      2018 10:23        EE/3/18: epileptiform activity, small number, left frontal temporal    Assessment:  Patient is a 57y old F with glioblastoma multiforme s/p subtotal resection and chemotherapy. Per chart, she was brought in for unresponsiveness after being given extra dose of morphine by her family. Pt is on hospice. She began seizing approximately 20 minutes after arriving to the ED.  She is on keppra 750mg BID and ativan 2mg PRN for seizures. Neuro was consulted for abnormal EEG results.       Plan: See below:     18 @ 15:44 Neurology Consult    Patient is a 57y old F with glioblastoma multiforme s/p subtotal resection and chemotherapy. Per chart, she was brought in for unresponsiveness after being given extra dose of morphine by her family. EMS gave narcan 1mg with some improvement in mental status. Pt is on hospice care for GBM. She began seizing approximately 20 minutes after arriving to the ED.  She is on keppra 750mg BID and ativan 2mg PRN for seizures started in ED. Neuro was consulted for abnormal EEG results. 1st dose of keppra documented at 2/3/18 at 22:00 EST.    PAST MEDICAL & SURGICAL HISTORY: glioblastoma multiforme s/p subtotal resection and chemotherapy, craniotomy and resection in Sep 2017, right knee surgery    FAMILY HISTORY:    Social History: (-) x 3    Allergies  inhalation anesthetics (Unknown)    MEDICATIONS  (STANDING):  dexamethasone  Injectable 2 milliGRAM(s) IV Push every 8 hours  dextrose 5% + sodium chloride 0.45% with potassium chloride 20 mEq/L 1000 milliLiter(s) (85 mL/Hr) IV Continuous <Continuous>  enoxaparin Injectable 40 milliGRAM(s) SubCutaneous at bedtime  levETIRAcetam  IVPB 750 milliGRAM(s) IV Intermittent every 12 hours  pantoprazole  Injectable 40 milliGRAM(s) IV Push daily  potassium chloride  10 mEq/100 mL IVPB 10 milliEquivalent(s) IV Intermittent every 1 hour    MEDICATIONS  (PRN):  ALBUTerol/ipratropium for Nebulization 3 milliLiter(s) Nebulizer every 4 hours PRN Shortness of Breath  LORazepam   Injectable 2 milliGRAM(s) IV Push every 4 hours PRN seizures  morphine Concentrate 10 milliGRAM(s) Oral every 6 hours PRN Moderate Pain (4 - 6)    Review of systems:    Constitutional: No fever, weight loss or fatigue    Eyes: No eye pain or discharge  ENMT:  No difficulty hearing; No sinus or throat pain  Neck: No pain or stiffness  Respiratory: No cough, wheezing, chills or hemoptysis  Cardiovascular: No chest pain, palpitations, shortness of breath, dyspnea on exertion  Gastrointestinal: No abdominal pain, nausea, vomiting or hematemesis; No diarrhea or constipation.   Genitourinary: No dysuria, frequency, hematuria or incontinence  Neurological: As per HPI  Skin: No rashes or lesions   Endocrine: No heat or cold intolerance; No hair loss  Musculoskeletal: No joint pain or swelling  Psychiatric: No depression, anxiety, mood swings  Heme/Lymph: No easy bruising or bleeding gums    Vital Signs Last 24 Hrs  T(C): 36.2 (2018 08:20), Max: 36.3 (2018 01:51)  T(F): 97.2 (2018 08:20), Max: 97.3 (2018 01:51)  HR: 78 (2018 08:20) (69 - 78)  BP: 154/81 (2018 08:20) (137/73 - 154/81)  BP(mean): --  RR: 18 (2018 11:15) (18 - 18)  SpO2: 97% (2018 01:51) (97% - 97%)    Neurologic Examination:  General:  Appearance is consistent with chronologic age.  No abnormal facies.  sedated and minimally responsive.  Cranial nerves: Pupils pinpoint.  No versive gaze.  Motor examination:   (+) spontaneous movements L > R.  No abnormal movements.  Reflexes:  (-) clonus.  R toe upgoing.  Sensory examination:  w/d and grimace to pain all 4 extremities    Labs:   CBC Full  -  ( 2018 10:23 )  WBC Count : 10.31 K/uL  Hemoglobin : 10.4 g/dL  Hematocrit : 31.7 %  Platelet Count - Automated : 228 K/uL  Mean Cell Volume : 93.2 fL  Mean Cell Hemoglobin : 30.6 pg  Mean Cell Hemoglobin Concentration : 32.8 g/dL  Auto Neutrophil # : x  Auto Lymphocyte # : x  Auto Monocyte # : x  Auto Eosinophil # : x  Auto Basophil # : x  Auto Neutrophil % : x  Auto Lymphocyte % : x  Auto Monocyte % : x  Auto Eosinophil % : x  Auto Basophil % : x    02-05    142  |  109  |  24<H>  ----------------------------<  97  3.3<L>   |  24  |  0.7    Ca    9.0      2018 10:23    EE/3/18: epileptiform activity, small number, left frontal temporal sharp transients    Assessment:  Patient is a 57y old F with glioblastoma multiforme s/p subtotal resection and chemotherapy. Per chart, she was brought in for unresponsiveness after being given extra dose of morphine by her family. Pt is on hospice. She began seizing approximately 20 minutes after arriving to the ED.  She is on keppra 750mg BID and ativan 2mg PRN for seizures. Neuro was consulted for abnormal EEG results.     Plan: See below:     -   18 @ 15:44

## 2018-02-05 NOTE — PROGRESS NOTE ADULT - ASSESSMENT
56yo with Glioblastoma, on home hospice, presented with unresponsiveness after morphine dose, had seizure in ER    -cont Keppra, Decadron IV  -Speech and swallow saw patient today, still to lethargic for safe swallow   -d/w daughter - she is deciding on complete comfort care vs peg  -DNR/DNI 58yo with Glioblastoma, on home hospice, presented with unresponsiveness after morphine dose, had seizure in ER    -cont Keppra, Decadron IV  -Speech and swallow saw patient today, still to lethargic for safe swallow   - as per palliative care, the family wants comfort care measures at this time. IV fluids discontinued, start the patient on comfort feeding.   -No labs to be drawn unless warranted in an emergency.  -DNR/DNI

## 2018-02-05 NOTE — SWALLOW BEDSIDE ASSESSMENT ADULT - SLP GENERAL OBSERVATIONS
Pt asleep, arousable however +lethargy noted. Pt required consistent cues to sustain arousal. Pt in no apparent pain

## 2018-02-05 NOTE — PROGRESS NOTE ADULT - SUBJECTIVE AND OBJECTIVE BOX
PGY-II progress Note    I have spoke with Palliative Care Attending - as per palliative care, the family wants comfort care measures at this time. We will d/c IV fluids, start the patient on comfort feeding. Also, no labs to be drawn unless warranted in an emergency.    D/w Medicine team.

## 2018-02-05 NOTE — CONSULT NOTE ADULT - ATTENDING COMMENTS
Patient is a 57y old F with glioblastoma multiforme s/p subtotal resection and chemotherapy currently presenting with seizure-like activity found to have epileptiform discharges on EEG likely secondary to LRE.      Plan:  1. Recommend continue keppra liquid 100 mg/ml  10 ml PO BID for now (1000 mg BID).    2. repeat REEG tomorrow.   3. continue hospice care.

## 2018-02-05 NOTE — PROGRESS NOTE ADULT - SUBJECTIVE AND OBJECTIVE BOX
Patient is a 57y old  Female who presents with a chief complaint of unresponsiveness/seizures in ED    INTERVAL HPI/OVERNIGHT EVENTS: lethargy  HPI:    MEDICATIONS  (STANDING):  dexamethasone  Injectable 2 milliGRAM(s) IV Push every 8 hours  enoxaparin Injectable 40 milliGRAM(s) SubCutaneous at bedtime  levETIRAcetam  IVPB 750 milliGRAM(s) IV Intermittent every 12 hours  pantoprazole  Injectable 40 milliGRAM(s) IV Push daily    MEDICATIONS  (PRN):  ALBUTerol/ipratropium for Nebulization 3 milliLiter(s) Nebulizer every 4 hours PRN Shortness of Breath  LORazepam   Injectable 2 milliGRAM(s) IV Push every 4 hours PRN seizures  morphine   Solution 10 milliGRAM(s) Oral every 6 hours PRN Moderate Pain (4 - 6)    Home Medications:      Vital Signs Last 24 Hrs  T(C): 35.6 (04 Feb 2018 07:50), Max: 35.9 (03 Feb 2018 15:15)  T(F): 96.1 (04 Feb 2018 07:50), Max: 96.7 (03 Feb 2018 15:15)  HR: 80 (04 Feb 2018 07:50) (69 - 84)  BP: 156/76 (04 Feb 2018 07:50) (140/77 - 156/76)  BP(mean): --  RR: 18 (03 Feb 2018 23:57) (18 - 20)  SpO2: --  CAPILLARY BLOOD GLUCOSE          General: lethargic, chronically ill appearing  CV: S1 S2  Pulm: decreased breath sounds at base  Abd: NT/ND/S +BS  EXT: no clubbing/cyanosis/edema  Neuro: unable to assess                          10.4   10.31 )-----------( 228      ( 05 Feb 2018 10:23 )             31.7   02-05    142  |  109  |  24<H>  ----------------------------<  97  3.3<L>   |  24  |  0.7    Ca    9.0      05 Feb 2018 10:23      RADIOLOGY & ADDITIONAL TESTS:  < from: EEG (02.03.18 @ 11:00) >  Abnormal due to the presence of   1-generalized slowing as described above  2-focal slowing as described above  3-breech artifact  4-left hemispheric ictal activity    CLINICAL CORRELATION  Consistent with prior left hemispheric surgery and left hemispheric   potential for partial seizure    Assessment and Plan:    58yo with GBM on home hospice p/w unresponsiveness and seizure.    -cont Keppra, Decadron IV as pt too lethargic to take anything PO  -seen by S&S again today and kept NPO  -cont Decadron and Keppra IV  -d/w daughter - she is deciding on complete comfort care vs peg  -DNR/DNI  -grave Px                GI/DVT Prophylaxis        Attending Attestation:   35 minutes spent on total encounter; more than 50% of the visit was spent counseling and/or coordinating care by the attending physician.     Plan discussed with staff.

## 2018-02-06 RX ORDER — LEVETIRACETAM 250 MG/1
1000 TABLET, FILM COATED ORAL
Qty: 0 | Refills: 0 | Status: DISCONTINUED | OUTPATIENT
Start: 2018-02-06 | End: 2018-02-08

## 2018-02-06 RX ADMIN — Medication 2 MILLIGRAM(S): at 22:32

## 2018-02-06 RX ADMIN — ENOXAPARIN SODIUM 40 MILLIGRAM(S): 100 INJECTION SUBCUTANEOUS at 22:32

## 2018-02-06 RX ADMIN — PANTOPRAZOLE SODIUM 40 MILLIGRAM(S): 20 TABLET, DELAYED RELEASE ORAL at 12:13

## 2018-02-06 RX ADMIN — MORPHINE SULFATE 10 MILLIGRAM(S): 50 CAPSULE, EXTENDED RELEASE ORAL at 15:29

## 2018-02-06 RX ADMIN — Medication 2 MILLIGRAM(S): at 18:11

## 2018-02-06 RX ADMIN — Medication 2 MILLIGRAM(S): at 05:35

## 2018-02-06 RX ADMIN — Medication 2 MILLIGRAM(S): at 14:47

## 2018-02-06 RX ADMIN — LEVETIRACETAM 1000 MILLIGRAM(S): 250 TABLET, FILM COATED ORAL at 17:36

## 2018-02-06 RX ADMIN — LEVETIRACETAM 400 MILLIGRAM(S): 250 TABLET, FILM COATED ORAL at 05:34

## 2018-02-06 RX ADMIN — MORPHINE SULFATE 10 MILLIGRAM(S): 50 CAPSULE, EXTENDED RELEASE ORAL at 05:59

## 2018-02-06 NOTE — SWALLOW BEDSIDE ASSESSMENT ADULT - SWALLOW EVAL: RECOMMENDED DIET
Case discussed with MD Parkinson, if deemed appropriate, comfort feeds given pt under hospice care. SLP recommending NPO with alternate means of nutrition and hydration.

## 2018-02-06 NOTE — PROGRESS NOTE ADULT - ASSESSMENT
Assessment and Plan:   · Assessment		  58yo with Glioblastoma, on home hospice, presented with unresponsiveness after morphine dose, had seizure in ER    -IV keppra switched to liquid form, d/w family, do not want PEG  -Speech and swallow saw patient today, still to lethargic for safe swallow   - Hospice to let medical team know when pt can be discharged home on hospice  -No labs to be drawn unless warranted in an emergency.  -DNR/DNI

## 2018-02-06 NOTE — SWALLOW BEDSIDE ASSESSMENT ADULT - SWALLOW EVAL: DIAGNOSIS
PO trials not appropriate at this time 2' pt lethargic, poor mental status. Poor awareness to task. Case discussed with MD Parkinson, if deemed appropriate, comfort feeds given pt under hospice care.

## 2018-02-06 NOTE — PROGRESS NOTE ADULT - SUBJECTIVE AND OBJECTIVE BOX
Patient is a 57y old  Female who presents with a chief complaint of unresponsiveness after morphine dose.  Unsafe swallow secondary to periods of lethargy, was unable to take oral keppra for seizures (had seizure in ER on admission).        PAST MEDICAL & SURGICAL HISTORY:  Glioblastoma Multiforme s/p radiation therapy, last chemo dose: 11/2017, craniotomy and subtotal resection (Sept 2017).      MEDICATIONS  (STANDING):  dexamethasone  Injectable 2 milliGRAM(s) IV Push every 8 hours  enoxaparin Injectable 40 milliGRAM(s) SubCutaneous at bedtime  levETIRAcetam  Solution 1000 milliGRAM(s) Oral two times a day  pantoprazole  Injectable 40 milliGRAM(s) IV Push daily    MEDICATIONS  (PRN):  ALBUTerol/ipratropium for Nebulization 3 milliLiter(s) Nebulizer every 4 hours PRN Shortness of Breath  LORazepam   Injectable 2 milliGRAM(s) IV Push every 4 hours PRN seizures  morphine Concentrate 10 milliGRAM(s) Oral every 6 hours PRN Moderate Pain (4 - 6)      Overnight events:    Vital Signs Last 24 Hrs  T(C): 36.1 (06 Feb 2018 07:41), Max: 36.1 (06 Feb 2018 07:41)  T(F): 97 (06 Feb 2018 07:41), Max: 97 (06 Feb 2018 07:41)  HR: 86 (05 Feb 2018 23:53) (86 - 86)  BP: 158/85 (06 Feb 2018 07:41) (158/85 - 177/83)  BP(mean): --  RR: 18 (06 Feb 2018 07:41) (18 - 18)  SpO2: --  CAPILLARY BLOOD GLUCOSE        I&O's Summary    05 Feb 2018 07:01  -  06 Feb 2018 07:00  --------------------------------------------------------  IN: 100 mL / OUT: 1 mL / NET: 99 mL        Physical Exam:    -      Cardiac: S1 + S2    -      Pulm: decreased breath sounds at bases     -      GI: soft, no grimacing on palpation        Labs:                        10.4   10.31 )-----------( 228      ( 05 Feb 2018 10:23 )             31.7             02-05    142  |  109  |  24<H>  ----------------------------<  97  3.3<L>   |  24  |  0.7    Ca    9.0      05 Feb 2018 10:23        EEG: < from: EEG (02.03.18 @ 11:00) >  Abnormal due to the presence of   1-generalized slowing as described above  2-focal slowing as described above  3-breech artifact  4-left hemispheric ictal activity    CLINICAL CORRELATION  Consistent with prior left hemispheric surgery and left hemispheric   potential for partial seizure                Imaging:    ECG:

## 2018-02-06 NOTE — PROGRESS NOTE ADULT - SUBJECTIVE AND OBJECTIVE BOX
Patient is a 57y old  Female who presents with a chief complaint of unresponsiveness, seizure    INTERVAL HPI/OVERNIGHT EVENTS: none    MEDICATIONS  (STANDING):  dexamethasone  Injectable 2 milliGRAM(s) IV Push every 8 hours  enoxaparin Injectable 40 milliGRAM(s) SubCutaneous at bedtime  levETIRAcetam  Solution 1000 milliGRAM(s) Oral two times a day  pantoprazole  Injectable 40 milliGRAM(s) IV Push daily    MEDICATIONS  (PRN):  ALBUTerol/ipratropium for Nebulization 3 milliLiter(s) Nebulizer every 4 hours PRN Shortness of Breath  LORazepam   Injectable 2 milliGRAM(s) IV Push every 4 hours PRN seizures  morphine Concentrate 10 milliGRAM(s) Oral every 6 hours PRN Moderate Pain (4 - 6)    Home Medications:      Vital Signs Last 24 Hrs  T(C): 36.1 (06 Feb 2018 07:41), Max: 36.1 (06 Feb 2018 07:41)  T(F): 97 (06 Feb 2018 07:41), Max: 97 (06 Feb 2018 07:41)  HR: 86 (05 Feb 2018 23:53) (86 - 86)  BP: 158/85 (06 Feb 2018 07:41) (158/85 - 177/83)  BP(mean): --  RR: 18 (06 Feb 2018 07:41) (18 - 18)  SpO2: --  CAPILLARY BLOOD GLUCOSE          General: alert, not following commands  CV: S1 S2  Pulm: decreased breath sounds at base  Abd: NT/ND/S +BS  EXT: no clubbing/cyanosis/edema  Neuro: not following commands    LABS:                        10.4   10.31 )-----------( 228      ( 05 Feb 2018 10:23 )             31.7     02-05    142  |  109  |  24<H>  ----------------------------<  97  3.3<L>   |  24  |  0.7    Ca    9.0      05 Feb 2018 10:23      RADIOLOGY & ADDITIONAL TESTS:    Assessment and Plan:  58yo with GBM on home hospice p/w unresponsiveness and seizure.    -d/w daughter, she does not want PEG. She wants us to change to PO Keppra (aware of aspiration risks) and wants to take pt home on hospice  -change Doroteo Banerjee to PO  -d/w hospice RN. Hospice to let medical team know when pt can be discharged home on hospice  -DNR/DNI  -grave Px

## 2018-02-07 ENCOUNTER — TRANSCRIPTION ENCOUNTER (OUTPATIENT)
Age: 58
End: 2018-02-07

## 2018-02-07 DIAGNOSIS — Z92.21 PERSONAL HISTORY OF ANTINEOPLASTIC CHEMOTHERAPY: ICD-10-CM

## 2018-02-07 DIAGNOSIS — C71.9 MALIGNANT NEOPLASM OF BRAIN, UNSPECIFIED: ICD-10-CM

## 2018-02-07 DIAGNOSIS — R53.1 WEAKNESS: ICD-10-CM

## 2018-02-07 DIAGNOSIS — G40.909 EPILEPSY, UNSPECIFIED, NOT INTRACTABLE, WITHOUT STATUS EPILEPTICUS: ICD-10-CM

## 2018-02-07 DIAGNOSIS — Z88.4 ALLERGY STATUS TO ANESTHETIC AGENT: ICD-10-CM

## 2018-02-07 DIAGNOSIS — N39.0 URINARY TRACT INFECTION, SITE NOT SPECIFIED: ICD-10-CM

## 2018-02-07 DIAGNOSIS — J18.9 PNEUMONIA, UNSPECIFIED ORGANISM: ICD-10-CM

## 2018-02-07 RX ORDER — DOCUSATE SODIUM 100 MG
0 CAPSULE ORAL
Qty: 0 | Refills: 0 | COMMUNITY

## 2018-02-07 RX ORDER — QUETIAPINE FUMARATE 200 MG/1
12.5 TABLET, FILM COATED ORAL
Qty: 0 | Refills: 0 | COMMUNITY

## 2018-02-07 RX ORDER — LEVETIRACETAM 250 MG/1
1 TABLET, FILM COATED ORAL
Qty: 0 | Refills: 0 | COMMUNITY

## 2018-02-07 RX ORDER — DEXAMETHASONE 0.5 MG/5ML
1 ELIXIR ORAL
Qty: 0 | Refills: 0 | COMMUNITY

## 2018-02-07 RX ORDER — LEVETIRACETAM 250 MG/1
10 TABLET, FILM COATED ORAL
Qty: 0 | Refills: 0 | COMMUNITY
Start: 2018-02-07

## 2018-02-07 RX ORDER — MORPHINE SULFATE 50 MG/1
0.5 CAPSULE, EXTENDED RELEASE ORAL
Qty: 0 | Refills: 0 | COMMUNITY
Start: 2018-02-07

## 2018-02-07 RX ORDER — DEXAMETHASONE 0.5 MG/5ML
2 ELIXIR ORAL THREE TIMES A DAY
Qty: 0 | Refills: 0 | Status: DISCONTINUED | OUTPATIENT
Start: 2018-02-07 | End: 2018-02-08

## 2018-02-07 RX ORDER — POLYETHYLENE GLYCOL 3350 17 G/17G
0 POWDER, FOR SOLUTION ORAL
Qty: 0 | Refills: 0 | COMMUNITY

## 2018-02-07 RX ORDER — PANTOPRAZOLE SODIUM 20 MG/1
40 TABLET, DELAYED RELEASE ORAL
Qty: 0 | Refills: 0 | Status: DISCONTINUED | OUTPATIENT
Start: 2018-02-07 | End: 2018-02-08

## 2018-02-07 RX ORDER — DEXAMETHASONE 0.5 MG/5ML
1 ELIXIR ORAL
Qty: 0 | Refills: 0 | COMMUNITY
Start: 2018-02-07

## 2018-02-07 RX ADMIN — ENOXAPARIN SODIUM 40 MILLIGRAM(S): 100 INJECTION SUBCUTANEOUS at 21:42

## 2018-02-07 RX ADMIN — Medication 2 MILLIGRAM(S): at 21:40

## 2018-02-07 RX ADMIN — Medication 2 MILLIGRAM(S): at 18:05

## 2018-02-07 RX ADMIN — LEVETIRACETAM 1000 MILLIGRAM(S): 250 TABLET, FILM COATED ORAL at 18:02

## 2018-02-07 RX ADMIN — Medication 2 MILLIGRAM(S): at 08:07

## 2018-02-07 RX ADMIN — PANTOPRAZOLE SODIUM 40 MILLIGRAM(S): 20 TABLET, DELAYED RELEASE ORAL at 14:10

## 2018-02-07 RX ADMIN — LEVETIRACETAM 1000 MILLIGRAM(S): 250 TABLET, FILM COATED ORAL at 08:06

## 2018-02-07 RX ADMIN — MORPHINE SULFATE 10 MILLIGRAM(S): 50 CAPSULE, EXTENDED RELEASE ORAL at 18:04

## 2018-02-07 NOTE — DISCHARGE NOTE ADULT - HOSPITAL COURSE
admitted after unresponsive episode after morphine dose.  History of Glioblastoma Multiforme.  Had seizure in ER.  Had been lethargic and unable to take keppra po.  Left hemispheric ictal activity on EEG.  Keppra dose increased.  Had IV keppra for most of hospital stay but towards end was switched to liquid form after it was decided patient would have comfort feeds and hospice at home.  Will continue on liquid keppra at home and can mix dexamethasone with food during comfort feeds. admitted after unresponsive episode after morphine dose.  History of Glioblastoma Multiforme.  Had seizure in ER.  Had been lethargic and unable to take keppra po.  Left hemispheric ictal activity on EEG.  Keppra dose increased.  Had IV keppra for most of hospital stay but towards end was switched to liquid form after it was decided patient would have comfort feeds and hospice at home as per family wishes. Daughter declined PEG which is appropriate. Will continue on liquid keppra at home and can mix dexamethasone with food during comfort feeds.

## 2018-02-07 NOTE — DISCHARGE NOTE ADULT - PATIENT PORTAL LINK FT
You can access the [facility] Portal, offered by [facility], by registering with the following website: [email or web address]

## 2018-02-07 NOTE — DISCHARGE NOTE ADULT - MEDICATION SUMMARY - MEDICATIONS TO TAKE
I will START or STAY ON the medications listed below when I get home from the hospital:    dexamethasone 2 mg oral tablet  -- 1 tab(s) by mouth 3 times a day  -- Indication: For GLIOBLASTOMA MULTIFORME    morphine 20 mg/mL oral concentrate  -- 0.5 milliliter(s) by mouth every 6 hours, As needed, Moderate Pain (4 - 6)  -- Indication: For GLIOBLASTOMA MULTIFORME    levETIRAcetam 100 mg/mL oral solution  -- 10 milliliter(s) by mouth 2 times a day  -- Indication: For Seizure disorder    Protonix 40 mg oral delayed release tablet  -- 1 tab(s) by mouth once a day  -- Indication: For reflux, GI prophylaxis

## 2018-02-07 NOTE — PROGRESS NOTE ADULT - SUBJECTIVE AND OBJECTIVE BOX
KWAME VU    Patient is a 57y old  Female who was admitted for new onset of seizure.     INTERVAL HPI/OVERNIGHT EVENTS:  patient is awake, she has no pain, she wants to go home.          T(C): 35.8 (02-07-18 @ 07:47), Max: 35.8 (02-07-18 @ 07:47)  HR: 83 (02-07-18 @ 07:47) (77 - 112)  BP: 153/86 (02-07-18 @ 07:47) (153/86 - 177/112)  RR: 18 (02-07-18 @ 00:12) (18 - 18)  SpO2: --  Wt(kg): --Vital Signs Last 24 Hrs  T(C): 35.8 (07 Feb 2018 07:47), Max: 35.8 (07 Feb 2018 07:47)  T(F): 96.4 (07 Feb 2018 07:47), Max: 96.4 (07 Feb 2018 07:47)  HR: 83 (07 Feb 2018 07:47) (77 - 112)  BP: 153/86 (07 Feb 2018 07:47) (153/86 - 177/112)  BP(mean): --  RR: 18 (07 Feb 2018 00:12) (18 - 18)  SpO2: --    PHYSICAL EXAM:  GENERAL: NAD,   HEAD:  Atraumatic, Normocephalic  EYES: EOMI, PERRLA, conjunctiva and sclera clear  NECK: Supple, No JVD, Normal thyroid  CHEST/LUNG: Clear to percussion bilaterally; No rales, rhonchi, wheezing, or rubs  HEART: Regular rate and rhythm; No murmurs, rubs, or gallops  ABDOMEN: Soft, Nontender, Nondistended; Bowel sounds present  EXTREMITIES:  2+ Peripheral Pulses, No clubbing, cyanosis, or edema  neurology: right side neglect.   Strength decreased in right side 3/5 in RUE and 1/5 in RLE, normal in left side.

## 2018-02-07 NOTE — DISCHARGE NOTE ADULT - MEDICATION SUMMARY - MEDICATIONS TO CHANGE
I will SWITCH the dose or number of times a day I take the medications listed below when I get home from the hospital:  None I will SWITCH the dose or number of times a day I take the medications listed below when I get home from the hospital:    dexamethasone 2 mg oral tablet  -- 1 tab(s) by mouth 2 times a day

## 2018-02-07 NOTE — DISCHARGE NOTE ADULT - CARE PLAN
Principal Discharge DX:	Glioblastoma multiforme of brain  Goal:	comfort care  Assessment and plan of treatment:	comfort care, hopsice at home, morphine prn, comfort feeding Principal Discharge DX:	Glioblastoma multiforme of brain  Goal:	comfort care  Assessment and plan of treatment:	comfort care, hospice at home, morphine prn, comfort feeding

## 2018-02-07 NOTE — PROGRESS NOTE ADULT - ASSESSMENT
56 yo female with PMH of Glioblastoma s/p radiation and craniotomy was admitted for new onset seizure.

## 2018-02-07 NOTE — DISCHARGE NOTE ADULT - MEDICATION SUMMARY - MEDICATIONS TO STOP TAKING
I will STOP taking the medications listed below when I get home from the hospital:    Keppra 500 mg oral tablet  -- 1 tab(s) by mouth 2 times a day    QUEtiapine 25 mg oral tablet  -- 12.5 milligram(s) by mouth 2 times a day

## 2018-02-07 NOTE — PROGRESS NOTE ADULT - ASSESSMENT
Patient is a 57y old  Female who presents with history of glioblastoma, increasing lethargy, has seizure in ER, no further seizures    -IV keppra switched to liquid form, d/w family, do not want PEG  -comfort feeds starting    - For discharge tomorrow with home hospice  -No labs to be drawn unless warranted in an emergency.  -DNR/DNI

## 2018-02-07 NOTE — PROGRESS NOTE ADULT - SUBJECTIVE AND OBJECTIVE BOX
Neurology Follow up note    Name  KWAME VU is a 58yo F with history of glioblastoma multiforme s/p radiation therapy, craniotomy, and subtotal resection. Neurology was consulted for new onset of seizures.     HPI:    On approach, pt was awake and alert. She was able to verbalize that she felt "good" but could not verbalize her responses when asked questions regarding review of systems. She did not appear to be in any acute distress.     Interval History:            Vital Signs Last 24 Hrs  T(C): 35.8 (07 Feb 2018 07:47), Max: 35.8 (07 Feb 2018 07:47)  T(F): 96.4 (07 Feb 2018 07:47), Max: 96.4 (07 Feb 2018 07:47)  HR: 83 (07 Feb 2018 07:47) (77 - 112)  BP: 153/86 (07 Feb 2018 07:47) (153/86 - 177/112)  BP(mean): --  RR: 18 (07 Feb 2018 00:12) (18 - 18)  SpO2: --    Neurological Exam:   Mental status: Awake, alert. AAOx1.  dysarthria present.  Cranial nerves: Pupils constricted; visual fields full, no nystagmus, extraocular muscles intact, V1 through V3 intact bilaterally and symmetric, face symmetric, hearing intact to finger rub, tongue midline- pt could not move tongue side to side  Motor:  MRC grading 4/5 b/l LUE and 1/5 RUE.   strength 5/5 in LUE and 3/5 in RUE.  Normal tone and bulk.  No abnormal movements.    Sensation: Intact to light touch  Reflexes: 2+ in bilateral UE/LE, downgoing toes bilaterally.       Medications  ALBUTerol/ipratropium for Nebulization 3 milliLiter(s) Nebulizer every 4 hours PRN  dexamethasone  Injectable 2 milliGRAM(s) IV Push every 8 hours  enoxaparin Injectable 40 milliGRAM(s) SubCutaneous at bedtime  levETIRAcetam  Solution 1000 milliGRAM(s) Oral two times a day  LORazepam   Injectable 2 milliGRAM(s) IV Push every 4 hours PRN  morphine Concentrate 10 milliGRAM(s) Oral every 6 hours PRN  pantoprazole  Injectable 40 milliGRAM(s) IV Push daily      Labs:      Radiology:     EEG 2/6/18: no epileptiform activity- diffuse cerebral electrophysiological dysfunction secondary to nonspecific cause consistent with focal electrophysiological dysfunction secondary to known structural cause.     Assessment: 58yo F with history of glioblastoma multiforme s/p radiation therapy, craniotomy, and subtotal resection. Neurology was consulted for new onset of seizures.      Plan: See below: Neurology Follow up note    Name  KWAME VU is a 58yo F with history of glioblastoma multiforme s/p radiation therapy, craniotomy, and subtotal resection. Neurology was consulted for new onset of seizures.   On approach, pt was awake and alert. She was able to verbalize that she felt "good" but could not verbalize her responses when asked questions regarding review of systems. She did not appear to be in any acute distress.     Vital Signs Last 24 Hrs  T(C): 35.8 (07 Feb 2018 07:47), Max: 35.8 (07 Feb 2018 07:47)  T(F): 96.4 (07 Feb 2018 07:47), Max: 96.4 (07 Feb 2018 07:47)  HR: 83 (07 Feb 2018 07:47) (77 - 112)  BP: 153/86 (07 Feb 2018 07:47) (153/86 - 177/112)  BP(mean): --  RR: 18 (07 Feb 2018 00:12) (18 - 18)  SpO2: --    Neurological Exam:   Mental status: Awake, alert. AAOx1.  dysarthria present. follows commands.  R sided neglect.  Cranial nerves: Pupils constricted; visual fields full, no nystagmus, extraocular muscles intact, V1 through V3 intact bilaterally and symmetric, face symmetric, hearing intact to finger rub, tongue midline- pt could not move tongue side to side  Motor:  MRC grading 4/5 b/l LUE and 1/5 RUE.   strength 5/5 in LUE and 3/5 in RUE.  Normal tone and bulk.  No abnormal movements.    Sensation: Intact to light touch  Reflexes: 2+ in bilateral UE/LE, downgoing toes bilaterally.     Medications  ALBUTerol/ipratropium for Nebulization 3 milliLiter(s) Nebulizer every 4 hours PRN  dexamethasone  Injectable 2 milliGRAM(s) IV Push every 8 hours  enoxaparin Injectable 40 milliGRAM(s) SubCutaneous at bedtime  levETIRAcetam  Solution 1000 milliGRAM(s) Oral two times a day  LORazepam   Injectable 2 milliGRAM(s) IV Push every 4 hours PRN  morphine Concentrate 10 milliGRAM(s) Oral every 6 hours PRN  pantoprazole  Injectable 40 milliGRAM(s) IV Push daily    Labs:      Radiology:     EEG 2/6/18: no epileptiform activity- diffuse cerebral electrophysiological dysfunction secondary to nonspecific cause consistent with focal electrophysiological dysfunction secondary to known structural cause.     Assessment: 58yo F with history of glioblastoma multiforme s/p radiation therapy, craniotomy, and subtotal resection. Neurology was consulted for new onset of seizures.      Plan: See below:

## 2018-02-07 NOTE — DISCHARGE NOTE ADULT - PLAN OF CARE
comfort care comfort care, hopsice at home, morphine prn, comfort feeding comfort care, hospice at home, morphine prn, comfort feeding

## 2018-02-07 NOTE — PROGRESS NOTE ADULT - SUBJECTIVE AND OBJECTIVE BOX
Patient is a 57y old  Female who presents with a chief complaint of unresponsiveness after morphine dose.  Unsafe swallow secondary to periods of lethargy, was unable to take oral keppra for seizures (had seizure in ER on admission).      PAST MEDICAL & SURGICAL HISTORY:  Glioblastoma Multiforme s/p radiation therapy, last chemo dose: 11/2017, craniotomy and subtotal resection (Sept 2017).      MEDICATIONS  (STANDING):  dexamethasone     Tablet 2 milliGRAM(s) Oral three times a day  enoxaparin Injectable 40 milliGRAM(s) SubCutaneous at bedtime  levETIRAcetam  Solution 1000 milliGRAM(s) Oral two times a day  pantoprazole    Tablet 40 milliGRAM(s) Oral before breakfast    MEDICATIONS  (PRN):  ALBUTerol/ipratropium for Nebulization 3 milliLiter(s) Nebulizer every 4 hours PRN Shortness of Breath  LORazepam   Injectable 2 milliGRAM(s) IV Push every 4 hours PRN seizures  morphine Concentrate 10 milliGRAM(s) Oral every 6 hours PRN Moderate Pain (4 - 6)    No overnight events     Vital Signs Last 24 Hrs  T(C): 35.8 (07 Feb 2018 07:47), Max: 35.8 (07 Feb 2018 07:47)  T(F): 96.4 (07 Feb 2018 07:47), Max: 96.4 (07 Feb 2018 07:47)  HR: 83 (07 Feb 2018 07:47) (77 - 112)  BP: 153/86 (07 Feb 2018 07:47) (153/86 - 177/112)  BP(mean): --  RR: 18 (07 Feb 2018 00:12) (18 - 18)  SpO2: --  CAPILLARY BLOOD GLUCOSE      Physical Exam:    -     General : lying in bed, NAD currently    -      Cardiac: S1 + S2    -      Pulm: decreased breath sounds at bases     -      GI: soft, no signs of pain on palpation    EEG: < from: EEG (02.03.18 @ 11:00) >  Abnormal due to the presence of   1-generalized slowing as described above  2-focal slowing as described above  3-breech artifact  4-left hemispheric ictal activity    CLINICAL CORRELATION  Consistent with prior left hemispheric surgery and left hemispheric   potential for partial seizure

## 2018-02-07 NOTE — PROGRESS NOTE ADULT - ATTENDING COMMENTS
Patient seen and examined at the bedside.  Agree with above.   58yo F with history of glioblastoma multiforme s/p radiation therapy, craniotomy, and subtotal resection w/new onset of seizures currently stable tolerating higher dose of keppra.      Plan:  - continue keppra 1000 mg Bid (liquid)  - no further inpt neurologic w/u  - call w/ questions
Agree with Hospitalist Dr Alexander's above notes

## 2018-02-08 ENCOUNTER — OUTPATIENT (OUTPATIENT)
Dept: OUTPATIENT SERVICES | Facility: HOSPITAL | Age: 58
LOS: 1 days | End: 2018-02-08

## 2018-02-08 VITALS
RESPIRATION RATE: 18 BRPM | DIASTOLIC BLOOD PRESSURE: 79 MMHG | TEMPERATURE: 97 F | SYSTOLIC BLOOD PRESSURE: 138 MMHG | HEART RATE: 74 BPM

## 2018-02-08 RX ORDER — DEXAMETHASONE 0.5 MG/5ML
1 ELIXIR ORAL
Qty: 90 | Refills: 0 | OUTPATIENT
Start: 2018-02-08 | End: 2018-03-09

## 2018-02-08 RX ORDER — LEVETIRACETAM 250 MG/1
10 TABLET, FILM COATED ORAL
Qty: 600 | Refills: 0 | OUTPATIENT
Start: 2018-02-08 | End: 2018-03-09

## 2018-02-08 RX ADMIN — Medication 2 MILLIGRAM(S): at 06:04

## 2018-02-08 RX ADMIN — LEVETIRACETAM 1000 MILLIGRAM(S): 250 TABLET, FILM COATED ORAL at 06:02

## 2018-02-08 RX ADMIN — Medication 2 MILLIGRAM(S): at 13:29

## 2018-02-08 RX ADMIN — PANTOPRAZOLE SODIUM 40 MILLIGRAM(S): 20 TABLET, DELAYED RELEASE ORAL at 06:02

## 2018-02-08 NOTE — PROGRESS NOTE ADULT - PROVIDER SPECIALTY LIST ADULT
Hospitalist
Internal Medicine
Neurology
Hospitalist
Internal Medicine

## 2018-02-08 NOTE — PROGRESS NOTE ADULT - SUBJECTIVE AND OBJECTIVE BOX
Patient is a 57y old  Female who presents with history of glioblastoma, increasing lethargy, has seizure in ER, no further seizures    -on oral keppra, mix dexamethasone and PPI with apple sauce or other soft food  -comfort feeds starting    - For discharge tomorrow with home hospice  -No labs to be drawn unless warranted in an emergency.  -DNR/DNI

## 2018-02-08 NOTE — PROGRESS NOTE ADULT - ASSESSMENT
Patient is a 57y old  Female who presents with a chief complaint of unresponsiveness after morphine dose, seizure in ED    PAST MEDICAL & SURGICAL HISTORY:  Glioblastoma Multiforme s/p radiation therapy, last chemo dose: 11/2017, craniotomy and subtotal resection (Sept 2017).        MEDICATIONS  (STANDING):  dexamethasone     Tablet 2 milliGRAM(s) Oral three times a day  enoxaparin Injectable 40 milliGRAM(s) SubCutaneous at bedtime  levETIRAcetam  Solution 1000 milliGRAM(s) Oral two times a day  pantoprazole    Tablet 40 milliGRAM(s) Oral before breakfast    MEDICATIONS  (PRN):  ALBUTerol/ipratropium for Nebulization 3 milliLiter(s) Nebulizer every 4 hours PRN Shortness of Breath  morphine Concentrate 10 milliGRAM(s) Oral every 6 hours PRN Moderate Pain (4 - 6)        Vital Signs Last 24 Hrs  T(C): 35.9 (08 Feb 2018 07:34), Max: 35.9 (08 Feb 2018 07:34)  T(F): 96.6 (08 Feb 2018 07:34), Max: 96.6 (08 Feb 2018 07:34)  HR: 74 (08 Feb 2018 07:34) (74 - 80)  BP: 138/79 (08 Feb 2018 07:34) (138/79 - 163/80)  BP(mean): --  RR: 18 (08 Feb 2018 07:34) (18 - 18)  SpO2: --  CAPILLARY BLOOD GLUCOSE        I&O's Summary      -     General : lying in bed, NAD, speaking a few words today (more than recently)    -      Cardiac: S1 + S2    -      Pulm: decreased breath sounds at bases     -      GI: soft, no signs of pain on palpation

## 2018-02-09 DIAGNOSIS — C71.9 MALIGNANT NEOPLASM OF BRAIN, UNSPECIFIED: ICD-10-CM

## 2018-02-11 ENCOUNTER — INPATIENT (INPATIENT)
Facility: HOSPITAL | Age: 58
LOS: 0 days | Discharge: HOSPICE | End: 2018-02-12
Attending: HOSPITALIST

## 2018-02-11 VITALS
HEART RATE: 85 BPM | DIASTOLIC BLOOD PRESSURE: 86 MMHG | OXYGEN SATURATION: 100 % | SYSTOLIC BLOOD PRESSURE: 132 MMHG | RESPIRATION RATE: 18 BRPM

## 2018-02-11 DIAGNOSIS — C71.9 MALIGNANT NEOPLASM OF BRAIN, UNSPECIFIED: ICD-10-CM

## 2018-02-11 DIAGNOSIS — Z98.890 OTHER SPECIFIED POSTPROCEDURAL STATES: Chronic | ICD-10-CM

## 2018-02-11 LAB
ALBUMIN SERPL ELPH-MCNC: 3.5 G/DL — SIGNIFICANT CHANGE UP (ref 3–5.5)
ALP SERPL-CCNC: 59 U/L — SIGNIFICANT CHANGE UP (ref 30–115)
ALT FLD-CCNC: 57 U/L — HIGH (ref 0–41)
AMMONIA BLD-MCNC: 62 MMOL/L — CRITICAL HIGH (ref 11–35)
ANION GAP SERPL CALC-SCNC: 10 MMOL/L — SIGNIFICANT CHANGE UP (ref 7–14)
APAP SERPL-MCNC: <10 UG/ML — SIGNIFICANT CHANGE UP (ref 10–30)
AST SERPL-CCNC: 45 U/L — HIGH (ref 0–41)
BILIRUB SERPL-MCNC: 1.1 MG/DL — SIGNIFICANT CHANGE UP (ref 0.2–1.2)
BUN SERPL-MCNC: 14 MG/DL — SIGNIFICANT CHANGE UP (ref 10–20)
CALCIUM SERPL-MCNC: 9.2 MG/DL — SIGNIFICANT CHANGE UP (ref 8.5–10.1)
CHLORIDE SERPL-SCNC: 105 MMOL/L — SIGNIFICANT CHANGE UP (ref 98–110)
CK MB BLD-MCNC: 48 % — HIGH (ref 0–4)
CK MB CFR SERPL CALC: 8.6 NG/ML — HIGH (ref 0.6–6.3)
CK SERPL-CCNC: 18 U/L — SIGNIFICANT CHANGE UP (ref 0–225)
CO2 SERPL-SCNC: 25 MMOL/L — SIGNIFICANT CHANGE UP (ref 17–32)
CREAT SERPL-MCNC: 0.6 MG/DL — LOW (ref 0.7–1.5)
GAS PNL BLDV: SIGNIFICANT CHANGE UP
GLUCOSE SERPL-MCNC: 143 MG/DL — HIGH (ref 70–110)
HCT VFR BLD CALC: 36.7 % — LOW (ref 37–47)
HGB BLD-MCNC: 12.1 G/DL — LOW (ref 14–18)
INR BLD: 0.89 RATIO — SIGNIFICANT CHANGE UP (ref 0.65–1.3)
MCHC RBC-ENTMCNC: 30.9 PG — SIGNIFICANT CHANGE UP (ref 27–31)
MCHC RBC-ENTMCNC: 33 G/DL — SIGNIFICANT CHANGE UP (ref 32–37)
MCV RBC AUTO: 93.9 FL — HIGH (ref 81–91)
NRBC # BLD: 0 /100 WBCS — SIGNIFICANT CHANGE UP (ref 0–0)
PLATELET # BLD AUTO: 232 K/UL — SIGNIFICANT CHANGE UP (ref 130–400)
POTASSIUM SERPL-MCNC: 4.3 MMOL/L — SIGNIFICANT CHANGE UP (ref 3.5–5)
POTASSIUM SERPL-SCNC: 4.3 MMOL/L — SIGNIFICANT CHANGE UP (ref 3.5–5)
PROT SERPL-MCNC: 5.5 G/DL — LOW (ref 6–8)
PROTHROM AB SERPL-ACNC: 9.6 SEC — LOW (ref 9.95–12.87)
RBC # BLD: 3.91 M/UL — LOW (ref 4.2–5.4)
RBC # FLD: 16.1 % — HIGH (ref 11.5–14.5)
SALICYLATES SERPL-MCNC: <4 MG/DL — SIGNIFICANT CHANGE UP (ref 4–30)
SODIUM SERPL-SCNC: 140 MMOL/L — SIGNIFICANT CHANGE UP (ref 135–146)
TROPONIN I SERPL-MCNC: 0.03 NG/ML — SIGNIFICANT CHANGE UP (ref 0–0.05)
WBC # BLD: 15.25 K/UL — HIGH (ref 4.8–10.8)
WBC # FLD AUTO: 15.25 K/UL — HIGH (ref 4.8–10.8)

## 2018-02-11 RX ORDER — PANTOPRAZOLE SODIUM 20 MG/1
40 TABLET, DELAYED RELEASE ORAL
Qty: 0 | Refills: 0 | Status: DISCONTINUED | OUTPATIENT
Start: 2018-02-11 | End: 2018-02-12

## 2018-02-11 RX ORDER — HALOPERIDOL DECANOATE 100 MG/ML
2.5 INJECTION INTRAMUSCULAR ONCE
Qty: 0 | Refills: 0 | Status: COMPLETED | OUTPATIENT
Start: 2018-02-11 | End: 2018-02-11

## 2018-02-11 RX ORDER — PANTOPRAZOLE SODIUM 20 MG/1
1 TABLET, DELAYED RELEASE ORAL
Qty: 0 | Refills: 0 | COMMUNITY

## 2018-02-11 RX ORDER — LEVETIRACETAM 250 MG/1
500 TABLET, FILM COATED ORAL EVERY 12 HOURS
Qty: 0 | Refills: 0 | Status: DISCONTINUED | OUTPATIENT
Start: 2018-02-11 | End: 2018-02-11

## 2018-02-11 RX ORDER — DEXAMETHASONE 0.5 MG/5ML
10 ELIXIR ORAL ONCE
Qty: 0 | Refills: 0 | Status: COMPLETED | OUTPATIENT
Start: 2018-02-11 | End: 2018-02-11

## 2018-02-11 RX ORDER — LEVETIRACETAM 250 MG/1
1000 TABLET, FILM COATED ORAL
Qty: 0 | Refills: 0 | Status: DISCONTINUED | OUTPATIENT
Start: 2018-02-11 | End: 2018-02-12

## 2018-02-11 RX ORDER — DEXAMETHASONE 0.5 MG/5ML
2 ELIXIR ORAL THREE TIMES A DAY
Qty: 0 | Refills: 0 | Status: DISCONTINUED | OUTPATIENT
Start: 2018-02-11 | End: 2018-02-12

## 2018-02-11 RX ORDER — MORPHINE SULFATE 50 MG/1
10 CAPSULE, EXTENDED RELEASE ORAL EVERY 6 HOURS
Qty: 0 | Refills: 0 | Status: DISCONTINUED | OUTPATIENT
Start: 2018-02-11 | End: 2018-02-12

## 2018-02-11 RX ORDER — ENOXAPARIN SODIUM 100 MG/ML
40 INJECTION SUBCUTANEOUS EVERY 24 HOURS
Qty: 0 | Refills: 0 | Status: DISCONTINUED | OUTPATIENT
Start: 2018-02-11 | End: 2018-02-12

## 2018-02-11 RX ADMIN — ENOXAPARIN SODIUM 40 MILLIGRAM(S): 100 INJECTION SUBCUTANEOUS at 06:27

## 2018-02-11 RX ADMIN — Medication 2 MILLIGRAM(S): at 13:15

## 2018-02-11 RX ADMIN — MORPHINE SULFATE 10 MILLIGRAM(S): 50 CAPSULE, EXTENDED RELEASE ORAL at 21:13

## 2018-02-11 RX ADMIN — MORPHINE SULFATE 10 MILLIGRAM(S): 50 CAPSULE, EXTENDED RELEASE ORAL at 18:27

## 2018-02-11 RX ADMIN — LEVETIRACETAM 420 MILLIGRAM(S): 250 TABLET, FILM COATED ORAL at 02:43

## 2018-02-11 RX ADMIN — Medication 2 MILLIGRAM(S): at 06:27

## 2018-02-11 RX ADMIN — Medication 2 MILLIGRAM(S): at 22:32

## 2018-02-11 RX ADMIN — MORPHINE SULFATE 10 MILLIGRAM(S): 50 CAPSULE, EXTENDED RELEASE ORAL at 14:54

## 2018-02-11 RX ADMIN — PANTOPRAZOLE SODIUM 40 MILLIGRAM(S): 20 TABLET, DELAYED RELEASE ORAL at 06:27

## 2018-02-11 RX ADMIN — MORPHINE SULFATE 10 MILLIGRAM(S): 50 CAPSULE, EXTENDED RELEASE ORAL at 22:04

## 2018-02-11 RX ADMIN — MORPHINE SULFATE 10 MILLIGRAM(S): 50 CAPSULE, EXTENDED RELEASE ORAL at 07:41

## 2018-02-11 RX ADMIN — Medication 10 MILLIGRAM(S): at 02:43

## 2018-02-11 RX ADMIN — HALOPERIDOL DECANOATE 2.5 MILLIGRAM(S): 100 INJECTION INTRAMUSCULAR at 17:05

## 2018-02-11 RX ADMIN — LEVETIRACETAM 1000 MILLIGRAM(S): 250 TABLET, FILM COATED ORAL at 17:05

## 2018-02-11 NOTE — ED ADULT NURSE REASSESSMENT NOTE - NS ED NURSE REASSESS COMMENT FT1
Patient brought into er, patient lives with boyfriend and he stated he is unable to care for her. Patient daughter called HCP Zee Olsen [phone] and stated patient is on hospice and is a dnr. Patient is confused with hx of stage 4 brain cancer. patient is apastic and inc of urine. Phone consent obtained for DNR from [facility]ania.

## 2018-02-11 NOTE — ED ADULT NURSE NOTE - CAS TRG GEN SKIN CONDITION
09/23/19        Fer Snowden  [address] [facility] [address] [facility] [address]      Dear Tish Braswell,    [address] [facility] records indicate that you have outstanding lab work and or testing that was ordered for you and has not yet been completed:  Orders Placed This Encounte
Warm

## 2018-02-11 NOTE — ED PROVIDER NOTE - OBJECTIVE STATEMENT
56 yo F with hx of gliobastoma, resected, currently hospice care at home, BIBEMS from home for evaluation of expressive aphasia unknown time of unset. Per EMS, patient lives with boyfriend, who called EMS and said he "could not deal with the aphasia anymore". Per EMS, they were not told when the aphasia began. NO other symptoms reported.

## 2018-02-11 NOTE — H&P ADULT - HISTORY OF PRESENT ILLNESS
58 YO woman brought to the ED by EMS because her boyfriend was not able to take care of her.  The patient currently still has aphasia but was able to verbalize that she is good and has no complaints at this moment and that she had no new events since she left the hospital.  The patient underwent craniotomy for resection of GBM on 9/14/17 after which her disease had progressed and the patient was admitted to the hospital on 2/1/18 for unresponsiveness when she was noted to have a worsening of her preexisting aphasia, she was discharged home on hospice given her bad prognosis.  The patient states that she feels well and would like to go back home in the morning.

## 2018-02-11 NOTE — ED PROVIDER NOTE - ATTENDING CONTRIBUTION TO CARE
58 yo f with pmh of glioblastoma, on home hospice, presents with c/o aphasia.  as per ems, boyfriend lives with pt and said he wanted the aphasia looked into.  pt recently d/c from the hospital 2 days ago.  unknown onset of glioblastoma no f/c, n/v/d, abd pain, back pian.  exam: nad, ncat, perrl, eomi, dry mm, rrr, ctab, abd soft, nt,nd aler  imp: pt with glioblastoma on home hospice,  confirmed that pt is on home hospice (by daughter). will have pt admitted for pna 56 yo f with pmh of glioblastoma, on home hospice, presents with c/o aphasia.  as per ems, boyfriend lives with pt and said he wanted the aphasia looked into.  pt recently d/c from the hospital 2 days ago.  unknown onset of glioblastoma no f/c, n/v/d, abd pain, back pian.  exam: nad, ncat, perrl, eomi, dry mm, rrr, ctab, abd soft, nt,nd aler  imp: pt with glioblastoma on home hospice,  confirmed that pt is on home hospice (by daughter). will have pt admitted for change in mental status, likely 2/2 brain tumor

## 2018-02-11 NOTE — H&P ADULT - PROBLEM SELECTOR PLAN 1
progressing, the patient is no active treatment and is on comfort care.  Causing worsening aphasia and cerebral edema  Will continue with Dexamethasone and keppra for now as there would be no benefit of temporary interventions since the patient is on no treatment for her cancer.  Will give DVT prophylaxis given the immobility and high risk of VTE associated with GBM. progressing, the patient is no active treatment and is on comfort care.  Causing worsening aphasia and cerebral edema  Will continue with Dexamethasone and keppra. pt was on home hospice but care giver is not able to care for her any more. SW to see.

## 2018-02-11 NOTE — ED PROVIDER NOTE - PROGRESS NOTE DETAILS
Neurology NP called, aware of patient in the ED and will come evaluate patient Radiology gave a preliminary read on the CT head non cont, it shows worsening edema and worsening left to right shift. d/w Dr. Gutierrez- says pt is supposed to be on home hospice, was not supposed to be brought to the hospital- states that she will contact hospice in AM and in the mean time admit to medicine, comfort care only Spoke to WILTON VALLES, daughter, [phone], who states mother has been aphasic since she was discharged 2 days ago from the hospital. She is DNR, hospice, comfort care only, she was not suppose to be brought to the hospital. She states to call her again if anything changes.

## 2018-02-11 NOTE — ED ADULT NURSE NOTE - OBJECTIVE STATEMENT
patient brought in from home , for increasing slurred speech. patient has stage 4 brain ca, patient is on hospice.

## 2018-02-11 NOTE — H&P ADULT - ASSESSMENT
56 YO woman who is on end of life care for glioblastoma multiforme who presented to the hospital for aphasia and was found to have cerebral edema on CT head. 56 YO woman who is on end of life care for glioblastoma multiforme who presented to the hospital because the care giver is not able to care for her.  to see. pt may need placement.

## 2018-02-11 NOTE — H&P ADULT - NSHPPHYSICALEXAM_GEN_ALL_CORE
Vital Signs Last 24 Hrs  T(C): 36.8 (11 Feb 2018 02:44), Max: 36.8 (11 Feb 2018 02:44)  T(F): 98.3 (11 Feb 2018 02:44), Max: 98.3 (11 Feb 2018 02:44)  HR: 61 (11 Feb 2018 02:44) (61 - 85)  BP: 137/75 (11 Feb 2018 02:44) (132/86 - 137/75)  BP(mean): --  RR: 18 (11 Feb 2018 02:44) (18 - 18)  SpO2: 99% (11 Feb 2018 02:44) (99% - 100%)  GENERAL APPEARANCE:  alert and cooperative, and appears to be in no acute distress.  HEENT: left sided craniotomy scare  CARDIAC: RRR, Normal S1 and S2. No S3, S4 or murmurs  LUNGS: Clear to auscultation without rales, rhonchi or wheezing.  ABDOMEN: Positive bowel sounds. Soft, nondistended, nontender. No guarding or rebound. No HSM.  EXTREMITIES: No edema. Peripheral pulses intact. No varicosities.  NEUROLOGICAL: motor power 2/5 in right upper and lower extremities, 5/5 on the left side, patient has motor aphasia but is able to verbalize simple words.

## 2018-02-12 ENCOUNTER — TRANSCRIPTION ENCOUNTER (OUTPATIENT)
Age: 58
End: 2018-02-12

## 2018-02-12 VITALS
SYSTOLIC BLOOD PRESSURE: 136 MMHG | TEMPERATURE: 97 F | RESPIRATION RATE: 18 BRPM | DIASTOLIC BLOOD PRESSURE: 66 MMHG | OXYGEN SATURATION: 99 % | HEART RATE: 68 BPM

## 2018-02-12 RX ADMIN — LEVETIRACETAM 1000 MILLIGRAM(S): 250 TABLET, FILM COATED ORAL at 06:31

## 2018-02-12 RX ADMIN — PANTOPRAZOLE SODIUM 40 MILLIGRAM(S): 20 TABLET, DELAYED RELEASE ORAL at 06:31

## 2018-02-12 RX ADMIN — Medication 2 MILLIGRAM(S): at 06:30

## 2018-02-12 RX ADMIN — ENOXAPARIN SODIUM 40 MILLIGRAM(S): 100 INJECTION SUBCUTANEOUS at 06:30

## 2018-02-12 NOTE — DISCHARGE NOTE ADULT - PATIENT PORTAL LINK FT
You can access the [facility] Patient Portal, offered by [facility], by registering with the following website: [email or web address]

## 2018-02-12 NOTE — DISCHARGE NOTE ADULT - HOSPITAL COURSE
58 YO woman who is on end of life care for glioblastoma multiforme who presented to the hospital because the care giver is not able to care for her. Patient will be discharged to hospice facility.

## 2018-02-12 NOTE — DISCHARGE NOTE ADULT - MEDICATION SUMMARY - MEDICATIONS TO TAKE
I will START or STAY ON the medications listed below when I get home from the hospital:    dexamethasone 2 mg oral tablet  -- 1 tab(s) by mouth 3 times a day  -- Indication: For BRAIN EDEMA    morphine 20 mg/mL oral concentrate  -- 0.5 milliliter(s) by mouth every 6 hours, As needed, Moderate Pain (4 - 6)  -- Indication: For PAIN    levETIRAcetam 100 mg/mL oral solution  -- 10 milliliter(s) by mouth 2 times a day  -- Indication: For Seizure prophylaxis     Protonix 40 mg oral delayed release tablet  -- 1 tab(s) by mouth once a day  -- Indication: For GI prophylaxis

## 2018-02-12 NOTE — DISCHARGE NOTE ADULT - CARE PLAN
Principal Discharge DX:	Glioblastoma multiforme  Goal:	Hospice comfort care  Assessment and plan of treatment:	Comfort care under hospice

## 2018-02-13 DIAGNOSIS — Z51.5 ENCOUNTER FOR PALLIATIVE CARE: ICD-10-CM

## 2018-02-13 DIAGNOSIS — G40.909 EPILEPSY, UNSPECIFIED, NOT INTRACTABLE, WITHOUT STATUS EPILEPTICUS: ICD-10-CM

## 2018-02-13 DIAGNOSIS — Z92.3 PERSONAL HISTORY OF IRRADIATION: ICD-10-CM

## 2018-02-13 DIAGNOSIS — Z92.21 PERSONAL HISTORY OF ANTINEOPLASTIC CHEMOTHERAPY: ICD-10-CM

## 2018-02-13 DIAGNOSIS — C71.9 MALIGNANT NEOPLASM OF BRAIN, UNSPECIFIED: ICD-10-CM

## 2018-02-13 DIAGNOSIS — Z66 DO NOT RESUSCITATE: ICD-10-CM

## 2018-02-13 DIAGNOSIS — R09.02 HYPOXEMIA: ICD-10-CM

## 2018-02-13 DIAGNOSIS — R41.82 ALTERED MENTAL STATUS, UNSPECIFIED: ICD-10-CM

## 2018-02-13 DIAGNOSIS — T40.2X1A POISONING BY OTHER OPIOIDS, ACCIDENTAL (UNINTENTIONAL), INITIAL ENCOUNTER: ICD-10-CM

## 2018-02-15 DIAGNOSIS — G93.6 CEREBRAL EDEMA: ICD-10-CM

## 2018-02-15 DIAGNOSIS — R47.01 APHASIA: ICD-10-CM

## 2018-02-15 DIAGNOSIS — C71.9 MALIGNANT NEOPLASM OF BRAIN, UNSPECIFIED: ICD-10-CM

## 2018-02-15 DIAGNOSIS — Z51.5 ENCOUNTER FOR PALLIATIVE CARE: ICD-10-CM

## 2018-05-14 NOTE — ED ADULT NURSE NOTE - PAIN RATING/NUMBER SCALE (0-10): REST
Pre-Procedure Text: After consent was obtained, the treatment areas were cleansed and treated using the parameters mentioned above. 0

## 2021-03-26 NOTE — ED ADULT NURSE NOTE - CHPI ED SYMPTOMS POS
DISORIENTATION/CONFUSION You can access the FollowMyHealth Patient Portal offered by [facility] by registering at the following website: [email or web address]. By joining [employer] [facility] [employer]’s FollowMyHealth portal, you will also be able to view your health information using other applications (apps) compatible with our system.

## 2024-04-05 NOTE — PROGRESS NOTE ADULT - SUBJECTIVE AND OBJECTIVE BOX
Strain urine for stone.  Push fluids and large quantity.  Dr. Kumar's office will contact you for follow-up appointment.  If any fever of 101 or higher return to the emergency room.   Patient is a 57y old  Female who presents with a chief complaint of unresponsiveness after morphine dose, seizure in ED (07 Feb 2018 15:01)      INTERVAL HPI/OVERNIGHT EVENTS:  HPI:    MEDICATIONS  (STANDING):  dexamethasone     Tablet 2 milliGRAM(s) Oral three times a day  enoxaparin Injectable 40 milliGRAM(s) SubCutaneous at bedtime  levETIRAcetam  Solution 1000 milliGRAM(s) Oral two times a day  pantoprazole    Tablet 40 milliGRAM(s) Oral before breakfast    MEDICATIONS  (PRN):  ALBUTerol/ipratropium for Nebulization 3 milliLiter(s) Nebulizer every 4 hours PRN Shortness of Breath  morphine Concentrate 10 milliGRAM(s) Oral every 6 hours PRN Moderate Pain (4 - 6)    Home Medications:  morphine 20 mg/mL oral concentrate: 0.5 milliliter(s) orally every 6 hours, As needed, Moderate Pain (4 - 6) (07 Feb 2018 17:17)  Protonix 40 mg oral delayed release tablet: 1 tab(s) orally once a day (07 Feb 2018 17:05)      Vital Signs Last 24 Hrs  T(C): 35.9 (08 Feb 2018 07:34), Max: 35.9 (08 Feb 2018 07:34)  T(F): 96.6 (08 Feb 2018 07:34), Max: 96.6 (08 Feb 2018 07:34)  HR: 74 (08 Feb 2018 07:34) (74 - 94)  BP: 138/79 (08 Feb 2018 07:34) (130/73 - 163/80)  BP(mean): --  RR: 18 (08 Feb 2018 07:34) (17 - 18)  SpO2: 98% (07 Feb 2018 15:30) (98% - 98%)  CAPILLARY BLOOD GLUCOSE          General: NAD, Alert  CV: S1 S2  Pulm: decreased breath sounds at base  Abd: NT/ND/S +BS  EXT: no clubbing/cyanosis/edema          Diet, Dysphagia 2 Mechanical Soft-Nectar Consistency Fluid (02-07-18 @ 12:01)  Diet, Dysphagia 2 Mechanical Soft-Nectar Consistency Fluid (02-07-18 @ 12:02)  Diet, Dysphagia 2 Mechanical Soft-Nectar Consistency Fluid (02-07-18 @ 12:03)  Prepare for Discharge (02-07-18 @ 15:00)  Do Not Resuscitate (02-07-18 @ 16:52)        RADIOLOGY & ADDITIONAL TESTS:    Assessment and Plan:    HEALTH ISSUES - PROBLEM Dx:  Glioblastoma multiforme of brain: Glioblastoma multiforme of brain  Seizure disorder: Seizure disorder      -cont current meds  -pt tolerating diet, meds  -cont aspiration precautions  -d/c home on hospice  -grave Px            GI/DVT Prophylaxis  discussed with staff